# Patient Record
Sex: MALE | Race: WHITE | NOT HISPANIC OR LATINO | URBAN - METROPOLITAN AREA
[De-identification: names, ages, dates, MRNs, and addresses within clinical notes are randomized per-mention and may not be internally consistent; named-entity substitution may affect disease eponyms.]

---

## 2017-03-21 PROBLEM — Z00.00 ENCOUNTER FOR PREVENTIVE HEALTH EXAMINATION: Status: ACTIVE | Noted: 2017-03-21

## 2017-03-23 ENCOUNTER — OUTPATIENT (OUTPATIENT)
Dept: OUTPATIENT SERVICES | Facility: HOSPITAL | Age: 82
LOS: 1 days | Discharge: HOME | End: 2017-03-23

## 2017-06-27 DIAGNOSIS — Z95.5 PRESENCE OF CORONARY ANGIOPLASTY IMPLANT AND GRAFT: ICD-10-CM

## 2017-06-27 DIAGNOSIS — I25.10 ATHEROSCLEROTIC HEART DISEASE OF NATIVE CORONARY ARTERY WITHOUT ANGINA PECTORIS: ICD-10-CM

## 2017-06-27 DIAGNOSIS — H25.89 OTHER AGE-RELATED CATARACT: ICD-10-CM

## 2018-01-08 ENCOUNTER — APPOINTMENT (OUTPATIENT)
Dept: VASCULAR SURGERY | Facility: CLINIC | Age: 83
End: 2018-01-08
Payer: MEDICARE

## 2018-01-08 VITALS
DIASTOLIC BLOOD PRESSURE: 52 MMHG | BODY MASS INDEX: 30.36 KG/M2 | SYSTOLIC BLOOD PRESSURE: 126 MMHG | HEIGHT: 69 IN | WEIGHT: 205 LBS

## 2018-01-08 DIAGNOSIS — Z78.9 OTHER SPECIFIED HEALTH STATUS: ICD-10-CM

## 2018-01-08 DIAGNOSIS — Z86.79 PERSONAL HISTORY OF OTHER DISEASES OF THE CIRCULATORY SYSTEM: ICD-10-CM

## 2018-01-08 DIAGNOSIS — Z86.39 PERSONAL HISTORY OF OTHER ENDOCRINE, NUTRITIONAL AND METABOLIC DISEASE: ICD-10-CM

## 2018-01-08 DIAGNOSIS — Z87.438 PERSONAL HISTORY OF OTHER DISEASES OF MALE GENITAL ORGANS: ICD-10-CM

## 2018-01-08 PROCEDURE — 99213 OFFICE O/P EST LOW 20 MIN: CPT

## 2018-01-08 PROCEDURE — 93880 EXTRACRANIAL BILAT STUDY: CPT

## 2018-01-08 RX ORDER — PREGABALIN 225 MG/1
225 CAPSULE ORAL
Refills: 0 | Status: ACTIVE | COMMUNITY

## 2018-01-08 RX ORDER — AMLODIPINE BESYLATE 2.5 MG/1
2.5 TABLET ORAL
Refills: 0 | Status: ACTIVE | COMMUNITY

## 2018-01-08 RX ORDER — TERAZOSIN 2 MG/1
2 CAPSULE ORAL
Refills: 0 | Status: ACTIVE | COMMUNITY

## 2018-01-08 RX ORDER — ATORVASTATIN CALCIUM 20 MG/1
20 TABLET, FILM COATED ORAL
Refills: 0 | Status: ACTIVE | COMMUNITY

## 2018-01-08 RX ORDER — TERAZOSIN 5 MG/1
5 CAPSULE ORAL
Refills: 0 | Status: ACTIVE | COMMUNITY

## 2018-01-08 RX ORDER — METOPROLOL SUCCINATE 25 MG/1
25 TABLET, EXTENDED RELEASE ORAL
Refills: 0 | Status: ACTIVE | COMMUNITY

## 2018-01-08 RX ORDER — LEVOTHYROXINE SODIUM 0.07 MG/1
75 TABLET ORAL
Refills: 0 | Status: ACTIVE | COMMUNITY

## 2018-01-08 RX ORDER — CHOLECALCIFEROL (VITAMIN D3) 25 MCG
TABLET ORAL
Refills: 0 | Status: ACTIVE | COMMUNITY

## 2018-01-08 RX ORDER — ASPIRIN 81 MG
81 TABLET, DELAYED RELEASE (ENTERIC COATED) ORAL
Refills: 0 | Status: ACTIVE | COMMUNITY

## 2018-01-08 RX ORDER — AMIODARONE HYDROCHLORIDE 100 MG/1
100 TABLET ORAL
Refills: 0 | Status: ACTIVE | COMMUNITY

## 2018-01-08 RX ORDER — CALCIUM 250 MG
TABLET ORAL
Refills: 0 | Status: ACTIVE | COMMUNITY

## 2018-01-08 RX ORDER — APIXABAN 2.5 MG/1
2.5 TABLET, FILM COATED ORAL
Refills: 0 | Status: ACTIVE | COMMUNITY

## 2018-02-13 ENCOUNTER — OUTPATIENT (OUTPATIENT)
Dept: OUTPATIENT SERVICES | Facility: HOSPITAL | Age: 83
LOS: 1 days | Discharge: HOME | End: 2018-02-13

## 2018-02-13 DIAGNOSIS — E03.1 CONGENITAL HYPOTHYROIDISM WITHOUT GOITER: ICD-10-CM

## 2018-02-13 DIAGNOSIS — N18.4 CHRONIC KIDNEY DISEASE, STAGE 4 (SEVERE): ICD-10-CM

## 2018-02-13 DIAGNOSIS — I25.10 ATHEROSCLEROTIC HEART DISEASE OF NATIVE CORONARY ARTERY WITHOUT ANGINA PECTORIS: ICD-10-CM

## 2018-02-13 DIAGNOSIS — D64.9 ANEMIA, UNSPECIFIED: ICD-10-CM

## 2018-06-05 ENCOUNTER — EMERGENCY (EMERGENCY)
Facility: HOSPITAL | Age: 83
LOS: 0 days | Discharge: NURSING, REHAB OR LONG-TERM CARE | End: 2018-06-05
Attending: EMERGENCY MEDICINE | Admitting: EMERGENCY MEDICINE

## 2018-06-05 VITALS
RESPIRATION RATE: 18 BRPM | HEART RATE: 50 BPM | DIASTOLIC BLOOD PRESSURE: 89 MMHG | SYSTOLIC BLOOD PRESSURE: 185 MMHG | TEMPERATURE: 96 F | WEIGHT: 205.03 LBS | OXYGEN SATURATION: 98 % | HEIGHT: 69 IN

## 2018-06-05 VITALS
RESPIRATION RATE: 18 BRPM | TEMPERATURE: 96 F | SYSTOLIC BLOOD PRESSURE: 167 MMHG | DIASTOLIC BLOOD PRESSURE: 89 MMHG | HEART RATE: 43 BPM | OXYGEN SATURATION: 98 %

## 2018-06-05 DIAGNOSIS — I10 ESSENTIAL (PRIMARY) HYPERTENSION: ICD-10-CM

## 2018-06-05 DIAGNOSIS — Z79.899 OTHER LONG TERM (CURRENT) DRUG THERAPY: ICD-10-CM

## 2018-06-05 DIAGNOSIS — Z88.0 ALLERGY STATUS TO PENICILLIN: ICD-10-CM

## 2018-06-05 DIAGNOSIS — M25.551 PAIN IN RIGHT HIP: ICD-10-CM

## 2018-06-05 DIAGNOSIS — E03.9 HYPOTHYROIDISM, UNSPECIFIED: ICD-10-CM

## 2018-06-05 DIAGNOSIS — E78.00 PURE HYPERCHOLESTEROLEMIA, UNSPECIFIED: ICD-10-CM

## 2018-06-05 DIAGNOSIS — G47.30 SLEEP APNEA, UNSPECIFIED: Chronic | ICD-10-CM

## 2018-06-05 RX ORDER — APIXABAN 2.5 MG/1
0 TABLET, FILM COATED ORAL
Qty: 0 | Refills: 0 | COMMUNITY

## 2018-06-05 RX ORDER — OXYCODONE AND ACETAMINOPHEN 5; 325 MG/1; MG/1
2 TABLET ORAL ONCE
Qty: 0 | Refills: 0 | Status: DISCONTINUED | OUTPATIENT
Start: 2018-06-05 | End: 2018-06-05

## 2018-06-05 RX ADMIN — OXYCODONE AND ACETAMINOPHEN 2 TABLET(S): 5; 325 TABLET ORAL at 12:05

## 2018-06-05 NOTE — ED ADULT TRIAGE NOTE - TEMPERATURE IN FAHRENHEIT (DEGREES F)
Has Your Skin Lesion Been Treated?: not been treated
Is This A New Presentation, Or A Follow-Up?: Skin Lesions
96.3

## 2018-06-05 NOTE — ED PROVIDER NOTE - PHYSICAL EXAMINATION
Vital Signs: I have reviewed the initial vital signs.  Constitutional: well-nourished, no acute distress, normocephalic  Eyes: PERRLA, EOMI, no nystagmus, clear conjunctiva  ENT: MMM, TM b/l clear , no nasal congestion  Cardiovascular: regular rate, regular rhythm, no murmur appreciated  Respiratory: unlabored respiratory effort, clear to auscultation bilaterally  Gastrointestinal: soft, non-tender, non-distended  abdomen, no pulsatile mass  Musculoskeletal: (+) decreased flexion and extension of the leg secondary to pain, no shortening or rotation to the leg, (+) tenderness to the right lateral aspect of the hip  Integumentary: warm, dry, no rash  Neurologic: awake, alert, cranial nerves II-XII grossly intact, extremities’ motor and sensory functions grossly intact, no focal deficits, GCS 15  Psychiatric: appropriate mood, appropriate affect Vital Signs: I have reviewed the initial vital signs.  Constitutional: well-nourished, no acute distress, normocephalic  Eyes: PERRLA, EOMI, no nystagmus, clear conjunctiva  ENT: MMM, TM b/l clear ,  Cardiovascular: regular rate, regular rhythm, no murmur appreciated  Respiratory: unlabored respiratory effort, clear to auscultation bilaterally  Gastrointestinal: soft, non-tender, non-distended  abdomen, no pulsatile mass  Musculoskeletal: (+) decreased flexion and extension of the leg secondary to pain, no shortening or rotation to the leg, (+) tenderness to the right lateral aspect of the hip  Integumentary: warm, dry, no rash  Neurologic: awake, alert, cranial nerves II-XII grossly intact, extremities’ motor and sensory functions grossly intact, no focal deficits, GCS 15  Psychiatric: appropriate mood, appropriate affect

## 2018-06-05 NOTE — ED PROVIDER NOTE - OBJECTIVE STATEMENT
Pt comes in c/o right hip pain. Pt states that he woke up fine and did his daily routine with no issue, got on the elevator and when exiting the elevator he felt severe pain in the right hip, tried to take a step the pain increased and was unable to bear weight in the extremity secondary to pain. pt states that he takes Lyrica for back pain and did take it this morning. Pt denies any injury or trauma to the extremity. Pt c/o right hip pain. Pt states that he woke up symptomatic and preformed his daily routine, got on the elevator and when exiting the elevator he felt  pain in the right hip, tried to take a step the pain increased and was unable to bear weight in the extremity secondary to pain. pt states that he takes Lyrica for back pain and did take it this morning. Pt denies any injury or trauma to the extremity. 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

## 2018-06-05 NOTE — ED PROVIDER NOTE - ATTENDING CONTRIBUTION TO CARE
I personally evaluated the patient. I reviewed the Resident’s or Physician Assistant’s note (as assigned above), and agree with the findings and plan except as documented in my note.  right lateral hip pain as above, on exam has some ttp over right greater trochanter with pain on internal rotation of hip and weightbearing, FROM, NVI, imaging reviewed, feels better, is able to ambulate with assistance, uses cane and/or walker at baseline, lives in independent living facility without steps, declines observation/rehab eval, will d/c with ortho f/u. Patient counseled regarding conditions which should prompt return.

## 2018-06-05 NOTE — ED PROVIDER NOTE - NS ED ROS FT
Review of Systems    Constitutional: (-) fever/ chills (-) weight loss  Eyes/ENT: (-) blurry vision, (-) epistaxis (-) sore throat (-) ear pain  Cardiovascular: (-) chest pain, (-) syncope (-) palpitations  Respiratory: (-) cough, (-) shortness of breath  Gastrointestinal: (-) vomiting, (-) diarrhea (-) abdominal pain  Musculoskeletal: (-) neck pain, (-) back pain, (+)right hip joint pain (-) pedal edema   Integumentary: (-) rash, (-) swelling  Neurological: (-) headache, (-) altered mental status  Psychiatric: (-) hallucinations or depression   Allergic/Immunologic: (-) pruritus

## 2018-08-22 ENCOUNTER — OUTPATIENT (OUTPATIENT)
Dept: OUTPATIENT SERVICES | Facility: HOSPITAL | Age: 83
LOS: 1 days | Discharge: HOME | End: 2018-08-22

## 2018-08-22 DIAGNOSIS — E78.5 HYPERLIPIDEMIA, UNSPECIFIED: ICD-10-CM

## 2018-08-22 DIAGNOSIS — R80.9 PROTEINURIA, UNSPECIFIED: ICD-10-CM

## 2018-08-22 DIAGNOSIS — I10 ESSENTIAL (PRIMARY) HYPERTENSION: ICD-10-CM

## 2018-08-22 DIAGNOSIS — D64.9 ANEMIA, UNSPECIFIED: ICD-10-CM

## 2018-08-22 DIAGNOSIS — G47.30 SLEEP APNEA, UNSPECIFIED: Chronic | ICD-10-CM

## 2018-08-22 DIAGNOSIS — N18.4 CHRONIC KIDNEY DISEASE, STAGE 4 (SEVERE): ICD-10-CM

## 2018-08-23 PROBLEM — I10 ESSENTIAL (PRIMARY) HYPERTENSION: Chronic | Status: ACTIVE | Noted: 2018-06-05

## 2018-08-23 PROBLEM — E03.9 HYPOTHYROIDISM, UNSPECIFIED: Chronic | Status: ACTIVE | Noted: 2018-06-05

## 2018-08-23 PROBLEM — E78.00 PURE HYPERCHOLESTEROLEMIA, UNSPECIFIED: Chronic | Status: ACTIVE | Noted: 2018-06-05

## 2019-01-04 ENCOUNTER — TRANSCRIPTION ENCOUNTER (OUTPATIENT)
Age: 84
End: 2019-01-04

## 2019-01-07 ENCOUNTER — APPOINTMENT (OUTPATIENT)
Dept: VASCULAR SURGERY | Facility: CLINIC | Age: 84
End: 2019-01-07
Payer: MEDICARE

## 2019-01-07 VITALS
HEIGHT: 69 IN | BODY MASS INDEX: 30.36 KG/M2 | DIASTOLIC BLOOD PRESSURE: 70 MMHG | SYSTOLIC BLOOD PRESSURE: 130 MMHG | WEIGHT: 205 LBS

## 2019-01-07 DIAGNOSIS — I65.23 OCCLUSION AND STENOSIS OF BILATERAL CAROTID ARTERIES: ICD-10-CM

## 2019-01-07 PROCEDURE — 99212 OFFICE O/P EST SF 10 MIN: CPT

## 2019-01-07 PROCEDURE — 93880 EXTRACRANIAL BILAT STUDY: CPT

## 2019-02-21 ENCOUNTER — APPOINTMENT (OUTPATIENT)
Dept: VASCULAR SURGERY | Facility: CLINIC | Age: 84
End: 2019-02-21

## 2019-02-26 ENCOUNTER — OUTPATIENT (OUTPATIENT)
Dept: OUTPATIENT SERVICES | Facility: HOSPITAL | Age: 84
LOS: 1 days | Discharge: HOME | End: 2019-02-26

## 2019-02-26 DIAGNOSIS — G47.30 SLEEP APNEA, UNSPECIFIED: Chronic | ICD-10-CM

## 2019-02-27 DIAGNOSIS — Z02.9 ENCOUNTER FOR ADMINISTRATIVE EXAMINATIONS, UNSPECIFIED: ICD-10-CM

## 2019-03-05 ENCOUNTER — OUTPATIENT (OUTPATIENT)
Dept: OUTPATIENT SERVICES | Facility: HOSPITAL | Age: 84
LOS: 1 days | Discharge: HOME | End: 2019-03-05

## 2019-03-05 DIAGNOSIS — Z02.9 ENCOUNTER FOR ADMINISTRATIVE EXAMINATIONS, UNSPECIFIED: ICD-10-CM

## 2019-03-05 DIAGNOSIS — G47.30 SLEEP APNEA, UNSPECIFIED: Chronic | ICD-10-CM

## 2019-04-09 ENCOUNTER — OUTPATIENT (OUTPATIENT)
Dept: OUTPATIENT SERVICES | Facility: HOSPITAL | Age: 84
LOS: 1 days | Discharge: HOME | End: 2019-04-09

## 2019-04-09 DIAGNOSIS — M10.9 GOUT, UNSPECIFIED: ICD-10-CM

## 2019-04-09 DIAGNOSIS — E55.9 VITAMIN D DEFICIENCY, UNSPECIFIED: ICD-10-CM

## 2019-04-09 DIAGNOSIS — G47.30 SLEEP APNEA, UNSPECIFIED: Chronic | ICD-10-CM

## 2019-04-09 DIAGNOSIS — E83.52 HYPERCALCEMIA: ICD-10-CM

## 2019-04-09 DIAGNOSIS — Z92.29 PERSONAL HISTORY OF OTHER DRUG THERAPY: ICD-10-CM

## 2019-04-09 DIAGNOSIS — R74.8 ABNORMAL LEVELS OF OTHER SERUM ENZYMES: ICD-10-CM

## 2019-09-11 ENCOUNTER — OUTPATIENT (OUTPATIENT)
Dept: OUTPATIENT SERVICES | Facility: HOSPITAL | Age: 84
LOS: 1 days | Discharge: HOME | End: 2019-09-11

## 2019-09-11 DIAGNOSIS — B58.81 TOXOPLASMA MYOCARDITIS: ICD-10-CM

## 2019-09-11 DIAGNOSIS — G47.30 SLEEP APNEA, UNSPECIFIED: Chronic | ICD-10-CM

## 2019-09-11 DIAGNOSIS — I25.10 ATHEROSCLEROTIC HEART DISEASE OF NATIVE CORONARY ARTERY WITHOUT ANGINA PECTORIS: ICD-10-CM

## 2019-09-11 DIAGNOSIS — R94.6 ABNORMAL RESULTS OF THYROID FUNCTION STUDIES: ICD-10-CM

## 2019-12-18 NOTE — ASU DISCHARGE PLAN (ADULT/PEDIATRIC) - ***IN THE EVENT THAT YOU DEVELOP A COMPLICATION AND YOU ARE UNABLE TO REACH YOUR OWN PHYSICIAN, YOU MAY CONTACT:
Pt 92% on RA post triage/O2 nc placed at 2L to increase Saturation. Pt not on O2 at [facility]. Pt has history of MS. Statement Selected

## 2019-12-19 ENCOUNTER — OUTPATIENT (OUTPATIENT)
Dept: OUTPATIENT SERVICES | Facility: HOSPITAL | Age: 84
LOS: 1 days | Discharge: HOME | End: 2019-12-19

## 2019-12-19 VITALS
RESPIRATION RATE: 18 BRPM | HEART RATE: 58 BPM | TEMPERATURE: 97 F | SYSTOLIC BLOOD PRESSURE: 172 MMHG | HEIGHT: 70 IN | WEIGHT: 214.95 LBS | OXYGEN SATURATION: 95 % | DIASTOLIC BLOOD PRESSURE: 79 MMHG

## 2019-12-19 VITALS — DIASTOLIC BLOOD PRESSURE: 71 MMHG | SYSTOLIC BLOOD PRESSURE: 181 MMHG | HEART RATE: 55 BPM

## 2019-12-19 DIAGNOSIS — H25.12 AGE-RELATED NUCLEAR CATARACT, LEFT EYE: ICD-10-CM

## 2019-12-19 DIAGNOSIS — G47.30 SLEEP APNEA, UNSPECIFIED: Chronic | ICD-10-CM

## 2019-12-19 DIAGNOSIS — Z98.890 OTHER SPECIFIED POSTPROCEDURAL STATES: Chronic | ICD-10-CM

## 2019-12-19 NOTE — ASU PATIENT PROFILE, ADULT - PSH
History of surgery  RIGHT EYE CATARACT EXTRACTION WITH IOL, SPINAL FUSION, LUMBAR DISCETOMY,AP  Sleep apnea

## 2020-01-03 DIAGNOSIS — H25.092 OTHER AGE-RELATED INCIPIENT CATARACT, LEFT EYE: ICD-10-CM

## 2020-01-03 DIAGNOSIS — Z79.01 LONG TERM (CURRENT) USE OF ANTICOAGULANTS: ICD-10-CM

## 2020-01-03 DIAGNOSIS — G47.33 OBSTRUCTIVE SLEEP APNEA (ADULT) (PEDIATRIC): ICD-10-CM

## 2020-01-03 DIAGNOSIS — Z79.82 LONG TERM (CURRENT) USE OF ASPIRIN: ICD-10-CM

## 2020-01-04 ENCOUNTER — INPATIENT (INPATIENT)
Facility: HOSPITAL | Age: 85
LOS: 1 days | Discharge: NURSING, REHAB OR LONG-TERM CARE | End: 2020-01-06
Attending: STUDENT IN AN ORGANIZED HEALTH CARE EDUCATION/TRAINING PROGRAM | Admitting: STUDENT IN AN ORGANIZED HEALTH CARE EDUCATION/TRAINING PROGRAM
Payer: MEDICARE

## 2020-01-04 VITALS
HEART RATE: 60 BPM | RESPIRATION RATE: 20 BRPM | DIASTOLIC BLOOD PRESSURE: 81 MMHG | TEMPERATURE: 98 F | OXYGEN SATURATION: 97 % | SYSTOLIC BLOOD PRESSURE: 198 MMHG

## 2020-01-04 DIAGNOSIS — Z98.890 OTHER SPECIFIED POSTPROCEDURAL STATES: Chronic | ICD-10-CM

## 2020-01-04 DIAGNOSIS — G47.30 SLEEP APNEA, UNSPECIFIED: Chronic | ICD-10-CM

## 2020-01-04 PROBLEM — I48.91 UNSPECIFIED ATRIAL FIBRILLATION: Chronic | Status: ACTIVE | Noted: 2019-12-19

## 2020-01-04 LAB
ALBUMIN SERPL ELPH-MCNC: 3.7 G/DL — SIGNIFICANT CHANGE UP (ref 3.5–5.2)
ALP SERPL-CCNC: 69 U/L — SIGNIFICANT CHANGE UP (ref 30–115)
ALT FLD-CCNC: 19 U/L — SIGNIFICANT CHANGE UP (ref 0–41)
ANION GAP SERPL CALC-SCNC: 12 MMOL/L — SIGNIFICANT CHANGE UP (ref 7–14)
ANION GAP SERPL CALC-SCNC: 9 MMOL/L — SIGNIFICANT CHANGE UP (ref 7–14)
APPEARANCE UR: CLEAR — SIGNIFICANT CHANGE UP
APTT BLD: 30 SEC — SIGNIFICANT CHANGE UP (ref 27–39.2)
AST SERPL-CCNC: 28 U/L — SIGNIFICANT CHANGE UP (ref 0–41)
BACTERIA # UR AUTO: NEGATIVE — SIGNIFICANT CHANGE UP
BASOPHILS # BLD AUTO: 0.03 K/UL — SIGNIFICANT CHANGE UP (ref 0–0.2)
BASOPHILS # BLD AUTO: 0.03 K/UL — SIGNIFICANT CHANGE UP (ref 0–0.2)
BASOPHILS NFR BLD AUTO: 0.4 % — SIGNIFICANT CHANGE UP (ref 0–1)
BASOPHILS NFR BLD AUTO: 0.4 % — SIGNIFICANT CHANGE UP (ref 0–1)
BILIRUB SERPL-MCNC: 0.3 MG/DL — SIGNIFICANT CHANGE UP (ref 0.2–1.2)
BILIRUB UR-MCNC: NEGATIVE — SIGNIFICANT CHANGE UP
BUN SERPL-MCNC: 36 MG/DL — HIGH (ref 10–20)
BUN SERPL-MCNC: 44 MG/DL — HIGH (ref 10–20)
CALCIUM SERPL-MCNC: 10.1 MG/DL — SIGNIFICANT CHANGE UP (ref 8.5–10.1)
CALCIUM SERPL-MCNC: 9.2 MG/DL — SIGNIFICANT CHANGE UP (ref 8.5–10.1)
CHLORIDE SERPL-SCNC: 110 MMOL/L — SIGNIFICANT CHANGE UP (ref 98–110)
CHLORIDE SERPL-SCNC: 113 MMOL/L — HIGH (ref 98–110)
CO2 SERPL-SCNC: 24 MMOL/L — SIGNIFICANT CHANGE UP (ref 17–32)
CO2 SERPL-SCNC: 25 MMOL/L — SIGNIFICANT CHANGE UP (ref 17–32)
COLOR SPEC: SIGNIFICANT CHANGE UP
CREAT SERPL-MCNC: 1.7 MG/DL — HIGH (ref 0.7–1.5)
CREAT SERPL-MCNC: 2.1 MG/DL — HIGH (ref 0.7–1.5)
DIFF PNL FLD: NEGATIVE — SIGNIFICANT CHANGE UP
EOSINOPHIL # BLD AUTO: 0.17 K/UL — SIGNIFICANT CHANGE UP (ref 0–0.7)
EOSINOPHIL # BLD AUTO: 0.36 K/UL — SIGNIFICANT CHANGE UP (ref 0–0.7)
EOSINOPHIL NFR BLD AUTO: 2 % — SIGNIFICANT CHANGE UP (ref 0–8)
EOSINOPHIL NFR BLD AUTO: 4.9 % — SIGNIFICANT CHANGE UP (ref 0–8)
EPI CELLS # UR: 0 /HPF — SIGNIFICANT CHANGE UP (ref 0–5)
ETHANOL SERPL-MCNC: <10 MG/DL — SIGNIFICANT CHANGE UP
GLUCOSE SERPL-MCNC: 108 MG/DL — HIGH (ref 70–99)
GLUCOSE SERPL-MCNC: 128 MG/DL — HIGH (ref 70–99)
GLUCOSE UR QL: NEGATIVE — SIGNIFICANT CHANGE UP
HCT VFR BLD CALC: 37 % — LOW (ref 42–52)
HCT VFR BLD CALC: 38.2 % — LOW (ref 42–52)
HGB BLD-MCNC: 11.8 G/DL — LOW (ref 14–18)
HGB BLD-MCNC: 12.2 G/DL — LOW (ref 14–18)
HYALINE CASTS # UR AUTO: 0 /LPF — SIGNIFICANT CHANGE UP (ref 0–7)
IMM GRANULOCYTES NFR BLD AUTO: 0.4 % — HIGH (ref 0.1–0.3)
IMM GRANULOCYTES NFR BLD AUTO: 0.5 % — HIGH (ref 0.1–0.3)
INR BLD: 1.08 RATIO — SIGNIFICANT CHANGE UP (ref 0.65–1.3)
KETONES UR-MCNC: NEGATIVE — SIGNIFICANT CHANGE UP
LACTATE SERPL-SCNC: 0.9 MMOL/L — SIGNIFICANT CHANGE UP (ref 0.7–2)
LEUKOCYTE ESTERASE UR-ACNC: NEGATIVE — SIGNIFICANT CHANGE UP
LIDOCAIN IGE QN: 19 U/L — SIGNIFICANT CHANGE UP (ref 7–60)
LYMPHOCYTES # BLD AUTO: 1.31 K/UL — SIGNIFICANT CHANGE UP (ref 1.2–3.4)
LYMPHOCYTES # BLD AUTO: 1.47 K/UL — SIGNIFICANT CHANGE UP (ref 1.2–3.4)
LYMPHOCYTES # BLD AUTO: 15.3 % — LOW (ref 20.5–51.1)
LYMPHOCYTES # BLD AUTO: 19.9 % — LOW (ref 20.5–51.1)
MAGNESIUM SERPL-MCNC: 1.9 MG/DL — SIGNIFICANT CHANGE UP (ref 1.8–2.4)
MCHC RBC-ENTMCNC: 31.9 G/DL — LOW (ref 32–37)
MCHC RBC-ENTMCNC: 31.9 G/DL — LOW (ref 32–37)
MCHC RBC-ENTMCNC: 33.9 PG — HIGH (ref 27–31)
MCHC RBC-ENTMCNC: 33.9 PG — HIGH (ref 27–31)
MCV RBC AUTO: 106.1 FL — HIGH (ref 80–94)
MCV RBC AUTO: 106.3 FL — HIGH (ref 80–94)
MONOCYTES # BLD AUTO: 1.04 K/UL — HIGH (ref 0.1–0.6)
MONOCYTES # BLD AUTO: 1.14 K/UL — HIGH (ref 0.1–0.6)
MONOCYTES NFR BLD AUTO: 13.3 % — HIGH (ref 1.7–9.3)
MONOCYTES NFR BLD AUTO: 14.1 % — HIGH (ref 1.7–9.3)
NEUTROPHILS # BLD AUTO: 4.45 K/UL — SIGNIFICANT CHANGE UP (ref 1.4–6.5)
NEUTROPHILS # BLD AUTO: 5.89 K/UL — SIGNIFICANT CHANGE UP (ref 1.4–6.5)
NEUTROPHILS NFR BLD AUTO: 60.2 % — SIGNIFICANT CHANGE UP (ref 42.2–75.2)
NEUTROPHILS NFR BLD AUTO: 68.6 % — SIGNIFICANT CHANGE UP (ref 42.2–75.2)
NITRITE UR-MCNC: NEGATIVE — SIGNIFICANT CHANGE UP
NRBC # BLD: 0 /100 WBCS — SIGNIFICANT CHANGE UP (ref 0–0)
NRBC # BLD: 0 /100 WBCS — SIGNIFICANT CHANGE UP (ref 0–0)
PH UR: 6 — SIGNIFICANT CHANGE UP (ref 5–8)
PHOSPHATE SERPL-MCNC: 2.9 MG/DL — SIGNIFICANT CHANGE UP (ref 2.1–4.9)
PLATELET # BLD AUTO: 101 K/UL — LOW (ref 130–400)
PLATELET # BLD AUTO: 106 K/UL — LOW (ref 130–400)
POTASSIUM SERPL-MCNC: 4.4 MMOL/L — SIGNIFICANT CHANGE UP (ref 3.5–5)
POTASSIUM SERPL-MCNC: 4.4 MMOL/L — SIGNIFICANT CHANGE UP (ref 3.5–5)
POTASSIUM SERPL-SCNC: 4.4 MMOL/L — SIGNIFICANT CHANGE UP (ref 3.5–5)
POTASSIUM SERPL-SCNC: 4.4 MMOL/L — SIGNIFICANT CHANGE UP (ref 3.5–5)
PROT SERPL-MCNC: 6 G/DL — SIGNIFICANT CHANGE UP (ref 6–8)
PROT UR-MCNC: ABNORMAL
PROTHROM AB SERPL-ACNC: 12.4 SEC — SIGNIFICANT CHANGE UP (ref 9.95–12.87)
RBC # BLD: 3.48 M/UL — LOW (ref 4.7–6.1)
RBC # BLD: 3.6 M/UL — LOW (ref 4.7–6.1)
RBC # FLD: 14.1 % — SIGNIFICANT CHANGE UP (ref 11.5–14.5)
RBC # FLD: 14.1 % — SIGNIFICANT CHANGE UP (ref 11.5–14.5)
RBC CASTS # UR COMP ASSIST: 1 /HPF — SIGNIFICANT CHANGE UP (ref 0–4)
SODIUM SERPL-SCNC: 146 MMOL/L — SIGNIFICANT CHANGE UP (ref 135–146)
SODIUM SERPL-SCNC: 147 MMOL/L — HIGH (ref 135–146)
SP GR SPEC: 1.03 — HIGH (ref 1.01–1.02)
TROPONIN T SERPL-MCNC: 0.02 NG/ML — HIGH
UROBILINOGEN FLD QL: SIGNIFICANT CHANGE UP
WBC # BLD: 7.39 K/UL — SIGNIFICANT CHANGE UP (ref 4.8–10.8)
WBC # BLD: 8.57 K/UL — SIGNIFICANT CHANGE UP (ref 4.8–10.8)
WBC # FLD AUTO: 7.39 K/UL — SIGNIFICANT CHANGE UP (ref 4.8–10.8)
WBC # FLD AUTO: 8.57 K/UL — SIGNIFICANT CHANGE UP (ref 4.8–10.8)
WBC UR QL: 1 /HPF — SIGNIFICANT CHANGE UP (ref 0–5)

## 2020-01-04 PROCEDURE — 72146 MRI CHEST SPINE W/O DYE: CPT | Mod: 26

## 2020-01-04 PROCEDURE — 72125 CT NECK SPINE W/O DYE: CPT | Mod: 26

## 2020-01-04 PROCEDURE — 99284 EMERGENCY DEPT VISIT MOD MDM: CPT

## 2020-01-04 PROCEDURE — 12011 RPR F/E/E/N/L/M 2.5 CM/<: CPT

## 2020-01-04 PROCEDURE — 93010 ELECTROCARDIOGRAM REPORT: CPT

## 2020-01-04 PROCEDURE — 70450 CT HEAD/BRAIN W/O DYE: CPT | Mod: 26

## 2020-01-04 PROCEDURE — 71045 X-RAY EXAM CHEST 1 VIEW: CPT | Mod: 26

## 2020-01-04 PROCEDURE — 74177 CT ABD & PELVIS W/CONTRAST: CPT | Mod: 26

## 2020-01-04 PROCEDURE — 72170 X-RAY EXAM OF PELVIS: CPT | Mod: 26

## 2020-01-04 PROCEDURE — 99223 1ST HOSP IP/OBS HIGH 75: CPT

## 2020-01-04 PROCEDURE — 71260 CT THORAX DX C+: CPT | Mod: 26

## 2020-01-04 PROCEDURE — 99291 CRITICAL CARE FIRST HOUR: CPT | Mod: 25,GC

## 2020-01-04 RX ORDER — LABETALOL HCL 100 MG
10 TABLET ORAL ONCE
Refills: 0 | Status: COMPLETED | OUTPATIENT
Start: 2020-01-04 | End: 2020-01-04

## 2020-01-04 RX ORDER — AMIODARONE HYDROCHLORIDE 400 MG/1
100 TABLET ORAL DAILY
Refills: 0 | Status: DISCONTINUED | OUTPATIENT
Start: 2020-01-04 | End: 2020-01-06

## 2020-01-04 RX ORDER — DOXAZOSIN MESYLATE 4 MG
4 TABLET ORAL AT BEDTIME
Refills: 0 | Status: DISCONTINUED | OUTPATIENT
Start: 2020-01-04 | End: 2020-01-04

## 2020-01-04 RX ORDER — SODIUM CHLORIDE 9 MG/ML
1000 INJECTION INTRAMUSCULAR; INTRAVENOUS; SUBCUTANEOUS
Refills: 0 | Status: DISCONTINUED | OUTPATIENT
Start: 2020-01-04 | End: 2020-01-05

## 2020-01-04 RX ORDER — AMLODIPINE BESYLATE 2.5 MG/1
0 TABLET ORAL
Qty: 90 | Refills: 0 | DISCHARGE

## 2020-01-04 RX ORDER — LOSARTAN/HYDROCHLOROTHIAZIDE 100MG-25MG
1 TABLET ORAL
Qty: 0 | Refills: 0 | DISCHARGE

## 2020-01-04 RX ORDER — LEVOTHYROXINE SODIUM 125 MCG
1 TABLET ORAL
Qty: 0 | Refills: 0 | DISCHARGE

## 2020-01-04 RX ORDER — AMLODIPINE BESYLATE 2.5 MG/1
2.5 TABLET ORAL DAILY
Refills: 0 | Status: DISCONTINUED | OUTPATIENT
Start: 2020-01-04 | End: 2020-01-06

## 2020-01-04 RX ORDER — HEPARIN SODIUM 5000 [USP'U]/ML
5000 INJECTION INTRAVENOUS; SUBCUTANEOUS EVERY 8 HOURS
Refills: 0 | Status: DISCONTINUED | OUTPATIENT
Start: 2020-01-04 | End: 2020-01-06

## 2020-01-04 RX ORDER — CALCITRIOL 0.5 UG/1
0 CAPSULE ORAL
Qty: 90 | Refills: 0 | DISCHARGE

## 2020-01-04 RX ORDER — LOSARTAN POTASSIUM 100 MG/1
50 TABLET, FILM COATED ORAL DAILY
Refills: 0 | Status: DISCONTINUED | OUTPATIENT
Start: 2020-01-04 | End: 2020-01-04

## 2020-01-04 RX ORDER — LOSARTAN POTASSIUM 100 MG/1
0 TABLET, FILM COATED ORAL
Qty: 180 | Refills: 0 | DISCHARGE

## 2020-01-04 RX ORDER — ALLOPURINOL 300 MG
100 TABLET ORAL DAILY
Refills: 0 | Status: DISCONTINUED | OUTPATIENT
Start: 2020-01-04 | End: 2020-01-06

## 2020-01-04 RX ORDER — ACETAMINOPHEN 500 MG
650 TABLET ORAL EVERY 6 HOURS
Refills: 0 | Status: DISCONTINUED | OUTPATIENT
Start: 2020-01-04 | End: 2020-01-06

## 2020-01-04 RX ORDER — OXYCODONE HYDROCHLORIDE 5 MG/1
5 TABLET ORAL EVERY 6 HOURS
Refills: 0 | Status: DISCONTINUED | OUTPATIENT
Start: 2020-01-04 | End: 2020-01-05

## 2020-01-04 RX ORDER — METOPROLOL TARTRATE 50 MG
0 TABLET ORAL
Qty: 0 | Refills: 0 | DISCHARGE

## 2020-01-04 RX ORDER — LEVOTHYROXINE SODIUM 125 MCG
50 TABLET ORAL DAILY
Refills: 0 | Status: DISCONTINUED | OUTPATIENT
Start: 2020-01-04 | End: 2020-01-04

## 2020-01-04 RX ORDER — MORPHINE SULFATE 50 MG/1
4 CAPSULE, EXTENDED RELEASE ORAL ONCE
Refills: 0 | Status: DISCONTINUED | OUTPATIENT
Start: 2020-01-04 | End: 2020-01-04

## 2020-01-04 RX ORDER — DOXAZOSIN MESYLATE 4 MG
2 TABLET ORAL AT BEDTIME
Refills: 0 | Status: DISCONTINUED | OUTPATIENT
Start: 2020-01-04 | End: 2020-01-06

## 2020-01-04 RX ORDER — DOXAZOSIN MESYLATE 4 MG
4 TABLET ORAL DAILY
Refills: 0 | Status: DISCONTINUED | OUTPATIENT
Start: 2020-01-04 | End: 2020-01-06

## 2020-01-04 RX ORDER — METOPROLOL TARTRATE 50 MG
25 TABLET ORAL DAILY
Refills: 0 | Status: DISCONTINUED | OUTPATIENT
Start: 2020-01-04 | End: 2020-01-06

## 2020-01-04 RX ORDER — PREDNISOLONE SODIUM PHOSPHATE 1 %
1 DROPS OPHTHALMIC (EYE)
Refills: 0 | Status: DISCONTINUED | OUTPATIENT
Start: 2020-01-04 | End: 2020-01-06

## 2020-01-04 RX ORDER — CHLORHEXIDINE GLUCONATE 213 G/1000ML
1 SOLUTION TOPICAL DAILY
Refills: 0 | Status: DISCONTINUED | OUTPATIENT
Start: 2020-01-04 | End: 2020-01-06

## 2020-01-04 RX ORDER — ALLOPURINOL 300 MG
0 TABLET ORAL
Qty: 0 | Refills: 0 | DISCHARGE

## 2020-01-04 RX ORDER — AMLODIPINE BESYLATE 2.5 MG/1
1 TABLET ORAL
Qty: 0 | Refills: 0 | DISCHARGE

## 2020-01-04 RX ORDER — ATORVASTATIN CALCIUM 80 MG/1
20 TABLET, FILM COATED ORAL AT BEDTIME
Refills: 0 | Status: DISCONTINUED | OUTPATIENT
Start: 2020-01-04 | End: 2020-01-06

## 2020-01-04 RX ORDER — METOPROLOL TARTRATE 50 MG
25 TABLET ORAL DAILY
Refills: 0 | Status: DISCONTINUED | OUTPATIENT
Start: 2020-01-04 | End: 2020-01-04

## 2020-01-04 RX ORDER — AMLODIPINE BESYLATE 2.5 MG/1
5 TABLET ORAL DAILY
Refills: 0 | Status: DISCONTINUED | OUTPATIENT
Start: 2020-01-04 | End: 2020-01-04

## 2020-01-04 RX ORDER — LEVOTHYROXINE SODIUM 125 MCG
75 TABLET ORAL DAILY
Refills: 0 | Status: DISCONTINUED | OUTPATIENT
Start: 2020-01-04 | End: 2020-01-06

## 2020-01-04 RX ORDER — CALCITRIOL 0.5 UG/1
0.25 CAPSULE ORAL DAILY
Refills: 0 | Status: DISCONTINUED | OUTPATIENT
Start: 2020-01-04 | End: 2020-01-06

## 2020-01-04 RX ORDER — PANTOPRAZOLE SODIUM 20 MG/1
40 TABLET, DELAYED RELEASE ORAL
Refills: 0 | Status: DISCONTINUED | OUTPATIENT
Start: 2020-01-04 | End: 2020-01-06

## 2020-01-04 RX ADMIN — Medication 75 MILLIGRAM(S): at 14:54

## 2020-01-04 RX ADMIN — ATORVASTATIN CALCIUM 20 MILLIGRAM(S): 80 TABLET, FILM COATED ORAL at 22:03

## 2020-01-04 RX ADMIN — SODIUM CHLORIDE 125 MILLILITER(S): 9 INJECTION INTRAMUSCULAR; INTRAVENOUS; SUBCUTANEOUS at 17:39

## 2020-01-04 RX ADMIN — CALCITRIOL 0.25 MICROGRAM(S): 0.5 CAPSULE ORAL at 17:01

## 2020-01-04 RX ADMIN — AMLODIPINE BESYLATE 5 MILLIGRAM(S): 2.5 TABLET ORAL at 14:45

## 2020-01-04 RX ADMIN — OXYCODONE HYDROCHLORIDE 5 MILLIGRAM(S): 5 TABLET ORAL at 23:14

## 2020-01-04 RX ADMIN — MORPHINE SULFATE 4 MILLIGRAM(S): 50 CAPSULE, EXTENDED RELEASE ORAL at 10:49

## 2020-01-04 RX ADMIN — Medication 10 MILLIGRAM(S): at 14:48

## 2020-01-04 RX ADMIN — HEPARIN SODIUM 5000 UNIT(S): 5000 INJECTION INTRAVENOUS; SUBCUTANEOUS at 14:47

## 2020-01-04 RX ADMIN — HEPARIN SODIUM 5000 UNIT(S): 5000 INJECTION INTRAVENOUS; SUBCUTANEOUS at 22:03

## 2020-01-04 RX ADMIN — Medication 2 MILLIGRAM(S): at 22:03

## 2020-01-04 RX ADMIN — Medication 200 MILLIGRAM(S): at 22:03

## 2020-01-04 RX ADMIN — Medication 650 MILLIGRAM(S): at 17:00

## 2020-01-04 RX ADMIN — Medication 100 MILLIGRAM(S): at 17:00

## 2020-01-04 RX ADMIN — Medication 25 MILLIGRAM(S): at 17:00

## 2020-01-04 RX ADMIN — OXYCODONE HYDROCHLORIDE 5 MILLIGRAM(S): 5 TABLET ORAL at 14:58

## 2020-01-04 RX ADMIN — SODIUM CHLORIDE 125 MILLILITER(S): 9 INJECTION INTRAMUSCULAR; INTRAVENOUS; SUBCUTANEOUS at 15:02

## 2020-01-04 RX ADMIN — OXYCODONE HYDROCHLORIDE 5 MILLIGRAM(S): 5 TABLET ORAL at 23:02

## 2020-01-04 NOTE — ED PROVIDER NOTE - CLINICAL SUMMARY MEDICAL DECISION MAKING FREE TEXT BOX
88M p/w fall 40min pta, HT, fall on Eliquis, unknown loc, syncope vs mechanical. On physical dry blood in the nares with occipital hematoma. remainder of the physical not noted to have acute traumatic injuries. trauma alert called. ordered cxr/pelvis, ct panscan. collar placed. CT + findings-Nondisplaced oblique fracture through the anterior aspect of the T4 vertebral body. Neuro surg consulted. Admitted to trauma service. Review of labs wnl. ED CXR reviewed by me which did not reveal a ptx, infiltrate, or effusion. ED Pelvis portable XR negative for fx's or dislocations

## 2020-01-04 NOTE — H&P ADULT - NSICDXPASTMEDICALHX_GEN_ALL_CORE_FT
PAST MEDICAL HISTORY:  Atrial fibrillation CAD WITH 2 STENTS    Gout     High cholesterol     Hypertension     Hypothyroid

## 2020-01-04 NOTE — ED ADULT NURSE NOTE - OBJECTIVE STATEMENT
Patient BIBEMS s/p fall at Assisted Living facility, patient states he slipped and fell, hit his head but denies LOC, patient on Eliquis, Trauma Alert activated upon arrival, patient has laceration to parietal area of head. Patient c/o neck and back pain.

## 2020-01-04 NOTE — ED PROVIDER NOTE - OBJECTIVE STATEMENT
89 y/o M PMHx HTN, CAD s/p stents on Eliquis presents to ED from assisted living facility s/p mechanical fall. Pt tripped and fell backwards on to a table this morning. C/o pain to the back of his head and bridge of his nose. No chest pain/SOB. Denies LOC. 89 y/o M PMHx HTN, HLD, CAD s/p stents, afib on Eliquis, sleep apnea on CPAP, hypothyroidism presents to ED from assisted living facility s/p mechanical fall. Pt tripped and fell backwards on to a table this morning. C/o pain to the back of his head and bridge of his nose. No chest pain/SOB. Denies LOC.

## 2020-01-04 NOTE — CONSULT NOTE ADULT - ASSESSMENT
ASSESSMENT:  87 y/o male with PMHx of HTN, HLD, CAD s/p stents, Hypothyroidism, Afib on Eliquis, Sleep apnea on CPAP presents to ED from assisted living facility s/p mechanical fall. +HT, -LOC.      PLAN:    - CXR, Pelvic xray  - Pan Scan (CTT head, C-spine, CT Chest, CT Abdomen/Pelvis)  - Trauma labs (CBC, BMP, Coags, T&S, UA)  - d/w Jim

## 2020-01-04 NOTE — H&P ADULT - NSHPLABSRESULTS_GEN_ALL_CORE
LABS  CBC ( @ 09:00)                              12.2<L>                         7.39    )----------------(  106<L>     60.2  % Neutrophils, 19.9<L>% Lymphocytes, ANC: 4.45                                38.2<L>    BMP ( @ 09:00)             147<H>  |  110     |  44<H> 		Ca++ --      Ca 10.1               ---------------------------------( 128<H>		Mg --                 4.4     |  25      |  2.1<H>			Ph --        LFTs ( @ 09:00)      TPro 6.0 / Alb 3.7 / TBili 0.3 / DBili -- / AST 28 / ALT 19 / AlkPhos 69    Coags ( @ 09:00)  aPTT 30.0 / INR 1.08 / PT 12.40      ABG ( 09:00)      /  /  /  /  / %     Lactate:  0.9      --------------------------------------------------------------------------------------------    MICROBIOLOGY  Urinalysis ( @ 11:05):     Color: Light Yellow / Appearance: Clear / S.030<H> / pH: 6.0 / Gluc: Negative / Ketones: Negative / Bili: Negative / Urobili: <2 mg/dL / Protein :30 mg/dL<!> / Nitrites: Negative / Leuk.Est: Negative / RBC: 1 / WBC: 1 / Sq Epi:  / Non Sq Epi: 0 / Bacteria Negative         --------------------------------------------------------------------------------------------  IMAGING:   < from: CT Abdomen and Pelvis w/ IV Cont (20 @ 10:08) >      IMPRESSION:     Nondisplaced oblique fracture through the anterior aspect of the T4 vertebral body (series 7 image 50).    Otherwise no CT evidence of acute traumatic chest/abdominopelvic pathology.    < end of copied text >    < from: CT Cervical Spine No Cont (20 @ 10:02) >    Impression: Postop changes the described above.    Degenerative changes are identified.    No acute fractures or dislocations are seen.    < end of copied text >    < from: CT Head No Cont (20 @ 10:02) >    Impression: Postop changes the described above.    Degenerative changes are identified.    No acute fractures or dislocations are seen.    < end of copied text >    < from: Xray Pelvis AP only (20 @ 09:37) >    Impression:  No evidence of acute fracture.    < end of copied text >

## 2020-01-04 NOTE — H&P ADULT - ATTENDING COMMENTS
s/p fall   87 y/o male with PMHx of HTN, HLD, CAD s/p stents on ASA, Hypothyroidism, Afib on Eliquis, Sleep apnea on CPAP, Gout presents to ED from assisted living facility s/p mechanical fall. +HT, -LOC.     Lumbar fracture  will need neurosurgery   will need MRI

## 2020-01-04 NOTE — CONSULT NOTE ADULT - SUBJECTIVE AND OBJECTIVE BOX
HPI: 89 y/o M PMHx HTN, HLD, CAD s/p stents, Afib on Eliquis, sleep apnea on CPAP, hypothyroidism presents to ED from assisted living facility s/p mechanical fall. Pt tripped and fell backwards on to a table this morning. C/o pain to the back of his head and bridge of his nose. No chest pain/SOB. Denies LOC.    Pt seen and examined at bedside with Dr Mcdonald.  Pt c/o pain to upper mid back after fall today.  Denies radicular symptoms, paresthesia or numbness to ext.          PAST MEDICAL & SURGICAL HISTORY:  Atrial fibrillation: CAD WITH 2 STENTS  Hypothyroid  Hypertension  High cholesterol  History of surgery: RIGHT EYE CATARACT EXTRACTION WITH IOL, SPINAL FUSION, LUMBAR DISCETOMY,AP  Sleep apnea      Home Medications:  allopurinol 100 mg oral tablet: orally once a day (19 Dec 2019 09:14)  amiodarone 100 mg oral tablet: tab(s) orally once a day (19 Dec 2019 09:14)  aspirin 81 mg oral tablet: 1 tab(s) orally once a day (19 Dec 2019 09:14)  atorvastatin 20 mg oral tablet: 1 tab(s) orally once a day (19 Dec 2019 09:14)  Eliquis 2.5 mg oral tablet: orally 2 times a day (19 Dec 2019 09:14)  levothyroxine 50 mcg (0.05 mg) oral capsule: 1 cap(s) orally once a day (19 Dec 2019 09:14)  losartan-hydrochlorothiazide 50mg-12.5mg oral tablet: 1 tab(s) orally once a day (19 Dec 2019 09:14)  Lyrica 75 mg oral capsule: orally 3 times a day (19 Dec 2019 09:14)  metoprolol tartrate 25 mg oral tablet: orally once a day (19 Dec 2019 09:14)  terazosin 5 mg oral tablet: 7 milligram(s) orally once a day (19 Dec 2019 09:14)      Allergies    penicillin (Rash)    Intolerances        MEDICATIONS  (STANDING):    MEDICATIONS  (PRN):      ICU Vital Signs Last 24 Hrs  T(C): 36.7 (2020 09:08), Max: 36.7 (2020 08:50)  T(F): 98.1 (2020 09:08), Max: 98.1 (2020 08:50)  HR: 59 (2020 09:08) (59 - 61)  BP: 203/67 (2020 09:08) (198/81 - 218/72)  BP(mean): --  ABP: --  ABP(mean): --  RR: 18 (2020 09:08) (18 - 20)  SpO2: 98% (2020 09:08) (97% - 98%)      I&O's Detail      CBC Full  -  ( 2020 09:00 )  WBC Count : 7.39 K/uL  RBC Count : 3.60 M/uL  Hemoglobin : 12.2 g/dL  Hematocrit : 38.2 %  Platelet Count - Automated : 106 K/uL  Mean Cell Volume : 106.1 fL  Mean Cell Hemoglobin : 33.9 pg  Mean Cell Hemoglobin Concentration : 31.9 g/dL  Auto Neutrophil # : 4.45 K/uL  Auto Lymphocyte # : 1.47 K/uL  Auto Monocyte # : 1.04 K/uL  Auto Eosinophil # : 0.36 K/uL  Auto Basophil # : 0.03 K/uL  Auto Neutrophil % : 60.2 %  Auto Lymphocyte % : 19.9 %  Auto Monocyte % : 14.1 %  Auto Eosinophil % : 4.9 %  Auto Basophil % : 0.4 %    01-04    147<H>  |  110  |  44<H>  ----------------------------<  128<H>  4.4   |  25  |  2.1<H>    Ca    10.1      2020 09:00    TPro  6.0  /  Alb  3.7  /  TBili  0.3  /  DBili  x   /  AST  28  /  ALT  19  /  AlkPhos  69  01-04    CARDIAC MARKERS ( 2020 09:00 )  x     / 0.02 ng/mL / x     / x     / x          Urinalysis Basic - ( 2020 11:05 )    Color: Light Yellow / Appearance: Clear / S.030 / pH: x  Gluc: x / Ketone: Negative  / Bili: Negative / Urobili: <2 mg/dL   Blood: x / Protein: 30 mg/dL / Nitrite: Negative   Leuk Esterase: Negative / RBC: 1 /HPF / WBC 1 /HPF   Sq Epi: x / Non Sq Epi: 0 /HPF / Bacteria: Negative        AAOX3. Verbal function intact  follows commands  PERRL  Motor: MAEx4  5/5 power in b/l UE and LE  5/5 DF/PF  Sensation: intact to touch in all extremities  +TTP to upper back        Imaging: < from: CT Chest w/ IV Cont (20 @ 10:07) >  IMPRESSION:     Nondisplaced oblique fracture through the anterior aspect of the T4 vertebral body (series 7 image 50).    < end of copied text >      Assessment/Plan:  No acute neurosurgical intervention  C-collar removed by Neurosgx attg  recommend MRI T-spine  pain management  muscle relaxer if needed  d/w attg

## 2020-01-04 NOTE — H&P ADULT - HISTORY OF PRESENT ILLNESS
89 y/o male with PMHx of HTN, HLD, CAD s/p stents on ASA, Hypothyroidism, Afib on Eliquis, Sleep apnea on CPAP, Gout presents to ED from assisted living facility s/p mechanical fall. +HT, -LOC.  Patient states that he tried to  something from the floor and then lost balance and fell backwards on to a table this morning. He is c/o of come pain in the back of his head and some back pain. Denies any chest pain, abdominal pain, or pain in his extremities

## 2020-01-04 NOTE — ED PROVIDER NOTE - NSFOLLOWUPINSTRUCTIONS_ED_ALL_ED_FT
Laceration    A laceration is a cut that goes through all of the layers of the skin and into the tissue that is right under the skin. Some lacerations heal on their own. Others need to be closed with stitches (sutures), staples, skin adhesive strips, or skin glue. Proper laceration care minimizes the risk of infection and helps the laceration to heal better.     SEEK IMMEDIATE MEDICAL CARE IF YOU HAVE THE FOLLOWING SYMPTOMS: swelling around the wound, worsening pain, drainage from the wound, red streaking going away from your wound, inability to move finger or toe near the laceration, or discoloration of skin near the laceration.     You have 2 absorbable sutures. You do NOT need to have any sutures removed. Follow up with your PMD if any complications develop.

## 2020-01-04 NOTE — ED PROVIDER NOTE - PHYSICAL EXAMINATION
CONSTITUTIONAL: well developed, well nourished, no acute distress  TRAUMA: ABC intact, GCS 15  HEAD: normocephalic, +3cm superficial abrasion/scalp hematoma to top of head   EYES: PERRL at 2mm, EOMI, no raccoon eyes  ENT: no nasal discharge, no septal hematoma, no hemotympanum, no mead sign, moist mucous membranes moist, no mandibular instability, no mandibular malalignment. +1cm lac to bridge of nose.   NECK: cervical collar in place, no midline tenderness, no stepoffs, no deformity  CV: regular rate and rhythm, equal distal pulses  RESP: lungs clear to auscultation bilaterally, normal work of breathing, symmetric rise and fall of chest   CHEST: no chest wall tenderness, no crepitus, no clavicular deformity/tenting  ABD: soft, nondistended, nontender, no rebound, no guarding, no rigidity, no pelvic instability  BACK: +midlineT/L spine tenderness, no stepoffs, no deformity, no saddle paresthesia  EXT: no obvious deformity, no tenderness of extremities, full ROM, cap refill < 2 seconds  SKIN: no rashes, +laceration to nasal bridge, +abrasions to top of head   NEURO: A&Ox3, motor strength 5/5 in all extremities, sensation grossly intact throughout, no focal neurological deficits  PSYCH: normal mood, appropriate affect

## 2020-01-04 NOTE — CONSULT NOTE ADULT - SUBJECTIVE AND OBJECTIVE BOX
88y m  88y m    TRAUMA ACTIVATION LEVEL:  trauma alert    MECHANISM OF INJURY:      [] Blunt  	[] MVC	[x] Fall	[] Pedestrian Struck	[] Motorcycle   [] Assault   [] Bicycle collision  [] Sports injury     [] Penetrating  	[] Gun Shot Wound 		[] Stab Wound    GCS: 	E: 4	V: 5	M: 6    88y old m s/p  HPI: 89 y/o male with PMHx of HTN, HLD, CAD s/p stents, Hypothyroidism, Afib on Eliquis, Sleep apnea on CPAP presents to ED from assisted living facility s/p mechanical fall. +HT, -LOC.  Patient states that he tried to  something from the floor and then lost balance and fell backwards on to a table this morning. He is c/o of come pain in the back of his head. Denies any chest pain, abdominal pain, or pain in his extremities    PAST MEDICAL & SURGICAL HISTORY:  Atrial fibrillation: CAD WITH 2 STENTS  Hypothyroid  Hypertension  High cholesterol  History of surgery: RIGHT EYE CATARACT EXTRACTION WITH IOL, SPINAL FUSION, LUMBAR DISCETOMY,AP  Sleep apnea    Allergies    penicillin (Rash)    Intolerances    Home Medications:  allopurinol 100 mg oral tablet: orally once a day (19 Dec 2019 09:14)  amiodarone 100 mg oral tablet: tab(s) orally once a day (19 Dec 2019 09:14)  aspirin 81 mg oral tablet: 1 tab(s) orally once a day (19 Dec 2019 09:14)  atorvastatin 20 mg oral tablet: 1 tab(s) orally once a day (19 Dec 2019 09:14)  Eliquis 2.5 mg oral tablet: orally 2 times a day (19 Dec 2019 09:14)  levothyroxine 50 mcg (0.05 mg) oral capsule: 1 cap(s) orally once a day (19 Dec 2019 09:14)  losartan-hydrochlorothiazide 50mg-12.5mg oral tablet: 1 tab(s) orally once a day (19 Dec 2019 09:14)  Lyrica 75 mg oral capsule: orally 3 times a day (19 Dec 2019 09:14)  metoprolol tartrate 25 mg oral tablet: orally once a day (19 Dec 2019 09:14)  terazosin 5 mg oral tablet: 7 milligram(s) orally once a day (19 Dec 2019 09:14)    ROS: 10-system review is otherwise negative except HPI above.      Primary Survey:    A - airway intact  B - bilateral breath sounds and good chest rise  C - palpable pulses in all extremities  D - GCS 15 on arrival, HARRIS  Exposure obtained    Vital Signs Last 24 Hrs  T(C): 36.7 (04 Jan 2020 09:08), Max: 36.7 (04 Jan 2020 08:50)  T(F): 98.1 (04 Jan 2020 09:08), Max: 98.1 (04 Jan 2020 08:50)  HR: 59 (04 Jan 2020 09:08) (59 - 61)  BP: 203/67 (04 Jan 2020 09:08) (198/81 - 218/72)  RR: 18 (04 Jan 2020 09:08) (18 - 20)  SpO2: 98% (04 Jan 2020 09:08) (97% - 98%)    Secondary Survey:   General: NAD  HEENT: Hematoma on the back of the head. abrasion over the nose  Neck: Soft, midline trachea. no cspine tenderness  Chest: No chest wall tenderness. or subq  emphysema   Cardiac: S1, S2,   Respiratory: Bilateral breath sounds, clear and equal bilaterally  Abdomen: Soft, non-distended, non-tender, no rebound,   Groin: pelvis stable   Ext: palp radial b/l UE, b/l DP palp in Lower Extrem.   Back: TTP over thoracic and lumbar spine, no palpable runoff/stepoff/deformity  Rectal: No allie blood, FRANDY with good tone    FAST    Procedures:    LABS:  pending      RADIOLOGY & ADDITIONAL STUDIES:  pending    ---------------------------------------------------------------------------------------

## 2020-01-04 NOTE — ED PROVIDER NOTE - PROGRESS NOTE DETAILS
dl - t4 nondisplaced vertebral body fx on ct scan. neurosurg consulted will eval pt DL- neurosurg recommending MRI, will admit to trauma service for further management

## 2020-01-04 NOTE — H&P ADULT - NSICDXPASTSURGICALHX_GEN_ALL_CORE_FT
PAST SURGICAL HISTORY:  History of surgery RIGHT EYE CATARACT EXTRACTION WITH IOL, SPINAL FUSION, LUMBAR DISCETOMY,AP    Sleep apnea

## 2020-01-04 NOTE — H&P ADULT - ASSESSMENT
87 y/o male with PMHx of HTN, HLD, CAD s/p stents on ASA, Hypothyroidism, Afib on Eliquis, Sleep apnea on CPAP, Gout presents to ED from assisted living facility s/p mechanical fall. +HT, -LOC. He sustained an 1cm laceration over his nose which was sutured by ED.   He was PAN scanned and found to have Nondisplaced oblique fracture through the anterior aspect of the T4 vertebral body.     Plan:   Admit to trauma   NPO, IVF  Bedrest for now  follow up neurosurgery recomendations  follow up MRI of the T-spine  Home meds  DVT/GI prophylaxis  PT/rehab  Pain control     Discussed with Dr Fernandez and Dr Velasco

## 2020-01-04 NOTE — H&P ADULT - NSHPPHYSICALEXAM_GEN_ALL_CORE
TRAUMA LEVEL OF ACTIVATION: ALERT  MECHANISM OF INJURY: fall    PHYSICAL EXAM:     Primary Survey:    A - airway intact  B - bilateral breath sounds and good chest rise  C - palpable pulses in all extremities  D - GCS 15 on arrival  Exposure obtained    General: NAD  HEENT: Hematoma on the back of the head. abrasion over the nose  Neck: Soft, midline trachea. no cspine tenderness  Chest: No chest wall tenderness. or subq  emphysema   Cardiac: S1, S2,   Respiratory: Bilateral breath sounds, clear and equal bilaterally  Abdomen: Soft, non-distended, non-tender, no rebound,   Groin: pelvis stable   Ext: palp radial b/l UE, b/l DP palp in Lower Extrem.   Back: TTP over thoracic and lumbar spine, no palpable runoff/stepoff/deformity

## 2020-01-05 LAB
ANION GAP SERPL CALC-SCNC: 10 MMOL/L — SIGNIFICANT CHANGE UP (ref 7–14)
BASOPHILS # BLD AUTO: 0.02 K/UL — SIGNIFICANT CHANGE UP (ref 0–0.2)
BASOPHILS NFR BLD AUTO: 0.2 % — SIGNIFICANT CHANGE UP (ref 0–1)
BUN SERPL-MCNC: 35 MG/DL — HIGH (ref 10–20)
CALCIUM SERPL-MCNC: 9.2 MG/DL — SIGNIFICANT CHANGE UP (ref 8.5–10.1)
CHLORIDE SERPL-SCNC: 113 MMOL/L — HIGH (ref 98–110)
CO2 SERPL-SCNC: 23 MMOL/L — SIGNIFICANT CHANGE UP (ref 17–32)
CREAT SERPL-MCNC: 1.8 MG/DL — HIGH (ref 0.7–1.5)
EOSINOPHIL # BLD AUTO: 0.09 K/UL — SIGNIFICANT CHANGE UP (ref 0–0.7)
EOSINOPHIL NFR BLD AUTO: 1.1 % — SIGNIFICANT CHANGE UP (ref 0–8)
GLUCOSE SERPL-MCNC: 150 MG/DL — HIGH (ref 70–99)
HCT VFR BLD CALC: 35.3 % — LOW (ref 42–52)
HGB BLD-MCNC: 11.1 G/DL — LOW (ref 14–18)
IMM GRANULOCYTES NFR BLD AUTO: 0.4 % — HIGH (ref 0.1–0.3)
LYMPHOCYTES # BLD AUTO: 0.98 K/UL — LOW (ref 1.2–3.4)
LYMPHOCYTES # BLD AUTO: 11.9 % — LOW (ref 20.5–51.1)
MAGNESIUM SERPL-MCNC: 2 MG/DL — SIGNIFICANT CHANGE UP (ref 1.8–2.4)
MCHC RBC-ENTMCNC: 31.4 G/DL — LOW (ref 32–37)
MCHC RBC-ENTMCNC: 33.9 PG — HIGH (ref 27–31)
MCV RBC AUTO: 108 FL — HIGH (ref 80–94)
MONOCYTES # BLD AUTO: 1.07 K/UL — HIGH (ref 0.1–0.6)
MONOCYTES NFR BLD AUTO: 13 % — HIGH (ref 1.7–9.3)
NEUTROPHILS # BLD AUTO: 6.03 K/UL — SIGNIFICANT CHANGE UP (ref 1.4–6.5)
NEUTROPHILS NFR BLD AUTO: 73.4 % — SIGNIFICANT CHANGE UP (ref 42.2–75.2)
NRBC # BLD: 0 /100 WBCS — SIGNIFICANT CHANGE UP (ref 0–0)
PHOSPHATE SERPL-MCNC: 2.8 MG/DL — SIGNIFICANT CHANGE UP (ref 2.1–4.9)
PLATELET # BLD AUTO: 93 K/UL — LOW (ref 130–400)
POTASSIUM SERPL-MCNC: 5 MMOL/L — SIGNIFICANT CHANGE UP (ref 3.5–5)
POTASSIUM SERPL-SCNC: 5 MMOL/L — SIGNIFICANT CHANGE UP (ref 3.5–5)
RBC # BLD: 3.27 M/UL — LOW (ref 4.7–6.1)
RBC # FLD: 14.2 % — SIGNIFICANT CHANGE UP (ref 11.5–14.5)
SODIUM SERPL-SCNC: 146 MMOL/L — SIGNIFICANT CHANGE UP (ref 135–146)
WBC # BLD: 8.22 K/UL — SIGNIFICANT CHANGE UP (ref 4.8–10.8)
WBC # FLD AUTO: 8.22 K/UL — SIGNIFICANT CHANGE UP (ref 4.8–10.8)

## 2020-01-05 PROCEDURE — 99232 SBSQ HOSP IP/OBS MODERATE 35: CPT

## 2020-01-05 RX ORDER — METHOCARBAMOL 500 MG/1
750 TABLET, FILM COATED ORAL EVERY 6 HOURS
Refills: 0 | Status: DISCONTINUED | OUTPATIENT
Start: 2020-01-05 | End: 2020-01-06

## 2020-01-05 RX ORDER — OXYCODONE HYDROCHLORIDE 5 MG/1
10 TABLET ORAL EVERY 6 HOURS
Refills: 0 | Status: DISCONTINUED | OUTPATIENT
Start: 2020-01-05 | End: 2020-01-06

## 2020-01-05 RX ORDER — SODIUM CHLORIDE 9 MG/ML
1000 INJECTION INTRAMUSCULAR; INTRAVENOUS; SUBCUTANEOUS
Refills: 0 | Status: DISCONTINUED | OUTPATIENT
Start: 2020-01-05 | End: 2020-01-06

## 2020-01-05 RX ADMIN — Medication 75 MICROGRAM(S): at 05:32

## 2020-01-05 RX ADMIN — HEPARIN SODIUM 5000 UNIT(S): 5000 INJECTION INTRAVENOUS; SUBCUTANEOUS at 05:33

## 2020-01-05 RX ADMIN — PANTOPRAZOLE SODIUM 40 MILLIGRAM(S): 20 TABLET, DELAYED RELEASE ORAL at 06:11

## 2020-01-05 RX ADMIN — Medication 200 MILLIGRAM(S): at 05:38

## 2020-01-05 RX ADMIN — METHOCARBAMOL 750 MILLIGRAM(S): 500 TABLET, FILM COATED ORAL at 17:52

## 2020-01-05 RX ADMIN — OXYCODONE HYDROCHLORIDE 10 MILLIGRAM(S): 5 TABLET ORAL at 11:15

## 2020-01-05 RX ADMIN — Medication 25 MILLIGRAM(S): at 05:32

## 2020-01-05 RX ADMIN — Medication 1 DROP(S): at 17:53

## 2020-01-05 RX ADMIN — METHOCARBAMOL 750 MILLIGRAM(S): 500 TABLET, FILM COATED ORAL at 23:02

## 2020-01-05 RX ADMIN — HEPARIN SODIUM 5000 UNIT(S): 5000 INJECTION INTRAVENOUS; SUBCUTANEOUS at 14:40

## 2020-01-05 RX ADMIN — ATORVASTATIN CALCIUM 20 MILLIGRAM(S): 80 TABLET, FILM COATED ORAL at 21:16

## 2020-01-05 RX ADMIN — OXYCODONE HYDROCHLORIDE 5 MILLIGRAM(S): 5 TABLET ORAL at 06:11

## 2020-01-05 RX ADMIN — OXYCODONE HYDROCHLORIDE 5 MILLIGRAM(S): 5 TABLET ORAL at 05:38

## 2020-01-05 RX ADMIN — METHOCARBAMOL 750 MILLIGRAM(S): 500 TABLET, FILM COATED ORAL at 11:35

## 2020-01-05 RX ADMIN — Medication 650 MILLIGRAM(S): at 23:02

## 2020-01-05 RX ADMIN — HEPARIN SODIUM 5000 UNIT(S): 5000 INJECTION INTRAVENOUS; SUBCUTANEOUS at 21:18

## 2020-01-05 RX ADMIN — Medication 650 MILLIGRAM(S): at 23:32

## 2020-01-05 RX ADMIN — SODIUM CHLORIDE 75 MILLILITER(S): 9 INJECTION INTRAMUSCULAR; INTRAVENOUS; SUBCUTANEOUS at 08:53

## 2020-01-05 RX ADMIN — CHLORHEXIDINE GLUCONATE 1 APPLICATION(S): 213 SOLUTION TOPICAL at 11:28

## 2020-01-05 RX ADMIN — Medication 1 DROP(S): at 06:09

## 2020-01-05 RX ADMIN — AMIODARONE HYDROCHLORIDE 100 MILLIGRAM(S): 400 TABLET ORAL at 05:32

## 2020-01-05 RX ADMIN — Medication 2 MILLIGRAM(S): at 21:16

## 2020-01-05 RX ADMIN — Medication 100 MILLIGRAM(S): at 11:35

## 2020-01-05 RX ADMIN — Medication 650 MILLIGRAM(S): at 11:35

## 2020-01-05 RX ADMIN — AMLODIPINE BESYLATE 2.5 MILLIGRAM(S): 2.5 TABLET ORAL at 05:31

## 2020-01-05 RX ADMIN — Medication 200 MILLIGRAM(S): at 21:16

## 2020-01-05 RX ADMIN — Medication 650 MILLIGRAM(S): at 17:53

## 2020-01-05 RX ADMIN — OXYCODONE HYDROCHLORIDE 10 MILLIGRAM(S): 5 TABLET ORAL at 10:45

## 2020-01-05 RX ADMIN — Medication 4 MILLIGRAM(S): at 05:31

## 2020-01-05 RX ADMIN — CALCITRIOL 0.25 MICROGRAM(S): 0.5 CAPSULE ORAL at 11:35

## 2020-01-05 RX ADMIN — Medication 200 MILLIGRAM(S): at 14:40

## 2020-01-05 NOTE — PROGRESS NOTE ADULT - SUBJECTIVE AND OBJECTIVE BOX
Subjective:     Pt with improved upper thoracic back pain today. MRI shows acute T4 fracture as well as mild T5 and T6 compression fractures, acute. Minimal T4-6 EDH. No cord compression.   T(C): 36.2 (01-05-20 @ 15:57), Max: 36.3 (01-04-20 @ 22:58)  HR: 57 (01-05-20 @ 15:57) (55 - 59)  BP: 143/67 (01-05-20 @ 15:57) (133/86 - 200/94)  RR: 18 (01-05-20 @ 15:57) (18 - 19)  SpO2: 99% (01-05-20 @ 06:23) (99% - 99%)  Wt(kg): --    Exam:  A&Ox3  PERRL  FS, TM  MAEW    CBC Full  -  ( 04 Jan 2020 20:51 )  WBC Count : 8.57 K/uL  RBC Count : 3.48 M/uL  Hemoglobin : 11.8 g/dL  Hematocrit : 37.0 %  Platelet Count - Automated : 101 K/uL  Mean Cell Volume : 106.3 fL  Mean Cell Hemoglobin : 33.9 pg  Mean Cell Hemoglobin Concentration : 31.9 g/dL  Auto Neutrophil # : 5.89 K/uL  Auto Lymphocyte # : 1.31 K/uL  Auto Monocyte # : 1.14 K/uL  Auto Eosinophil # : 0.17 K/uL  Auto Basophil # : 0.03 K/uL  Auto Neutrophil % : 68.6 %  Auto Lymphocyte % : 15.3 %  Auto Monocyte % : 13.3 %  Auto Eosinophil % : 2.0 %  Auto Basophil % : 0.4 %    01-04    146  |  113<H>  |  36<H>  ----------------------------<  108<H>  4.4   |  24  |  1.7<H>    Ca    9.2      04 Jan 2020 20:51  Phos  2.9     01-04  Mg     1.9     01-04    TPro  6.0  /  Alb  3.7  /  TBili  0.3  /  DBili  x   /  AST  28  /  ALT  19  /  AlkPhos  69  01-04    PT/INR - ( 04 Jan 2020 09:00 )   PT: 12.40 sec;   INR: 1.08 ratio         PTT - ( 04 Jan 2020 09:00 )  PTT:30.0 sec          Assessment/Plan:      Pt with minimal back pain. Sig improved from yesterday. No neurologic deficit. Pt OK to be OOB and have PT. F/u as outpt 2 weeks post discharge

## 2020-01-05 NOTE — PROGRESS NOTE ADULT - ATTENDING COMMENTS
I examined the patient and discussed my plan with the resident    the patient has limited mobility at home, uses a cane, and has had frequent falls.   here he has an acute t4 vb fx and underwent mri for stability status.  if stable he will require pt evaluation and rehab placement.  he has pain but can not receive nsaids due to erica on ckd.

## 2020-01-05 NOTE — PROGRESS NOTE ADULT - ASSESSMENT
88M s/p fall at assisted living facility, +HT, -LOC, +eliquis for afib and ASA, found to have T4 vertebral body fracture.     PLAN:   - FU MRI thoracic spine read, NSX recommendations  - NPO, IVF  - Cr 2.1 > 1.7  - holding home losartan d/t PITER  - pain management  - encourage IS  -  hospitalist c/s, PT/rehab

## 2020-01-05 NOTE — PROGRESS NOTE ADULT - SUBJECTIVE AND OBJECTIVE BOX
GENERAL SURGERY PROGRESS NOTE     LITZY LÓPEZ  88y  Male  Hospital day :1d  POD:  Procedure:     OVERNIGHT EVENTS:  Admitted for T4 VB fracture. Received MRI thoracic spine, not read.     T(F): 97.3 (20 @ 22:58), Max: 98.1 (20 @ 08:50)  HR: 55 (20 @ 22:58) (55 - 61)  BP: 189/77 (20 @ 22:58) (154/65 - 218/72)  ABP: --  ABP(mean): --  RR: 19 (20 @ 22:58) (18 - 20)  SpO2: 98% (20 @ 15:20) (97% - 98%)    DIET/FLUIDS: calcitriol   Capsule 0.25 MICROGram(s) Oral daily  sodium chloride 0.9%. 1000 milliLiter(s) IV Continuous <Continuous>     GI proph:  pantoprazole    Tablet 40 milliGRAM(s) Oral before breakfast  AC/ proph: heparin  Injectable 5000 Unit(s) SubCutaneous every 8 hours      PHYSICAL EXAM:  GENERAL: NAD, well-appearing  CHEST/LUNG: Clear to auscultation bilaterally  HEART: Regular rate and rhythm  ABDOMEN: Soft, Nontender, Nondistended;   EXTREMITIES:  No clubbing, cyanosis, or edema      LABS  Labs:  CAPILLARY BLOOD GLUCOSE    POCT Blood Glucose.: 124 mg/dL (2020 08:54)                        11.8   8.57  )-----------( 101      ( 2020 20:51 )             37.0       Auto Neutrophil %: 68.6 % (20 @ 20:51)  Auto Immature Granulocyte %: 0.4 % (20 @ 20:51)  Auto Neutrophil %: 60.2 % (20 @ 09:00)  Auto Immature Granulocyte %: 0.5 % (20 @ 09:00)        146  |  113<H>  |  36<H>  ----------------------------<  108<H>  4.4   |  24  |  1.7<H>    Calcium, Total Serum: 9.2 mg/dL (20 @ 20:51)    LFTs:             6.0  | 0.3  | 28       ------------------[69      ( 2020 09:00 )  3.7  | x    | 19          Lipase:19     Amylase:x        Lactate, Blood: 0.9 mmol/L (20 @ 09:00)    Coags:     12.40  ----< 1.08    ( 2020 09:00 )     30.0      CARDIAC MARKERS ( 2020 09:00 )  x     / 0.02 ng/mL / x     / x     / x        Alcohol, Blood: <10 mg/dL (20 @ 09:00)    Urinalysis Basic - ( 2020 11:05 )    Color: Light Yellow / Appearance: Clear / S.030 / pH: x  Gluc: x / Ketone: Negative  / Bili: Negative / Urobili: <2 mg/dL   Blood: x / Protein: 30 mg/dL / Nitrite: Negative   Leuk Esterase: Negative / RBC: 1 /HPF / WBC 1 /HPF   Sq Epi: x / Non Sq Epi: 0 /HPF / Bacteria: Negative      RADIOLOGY & ADDITIONAL TESTS:  < from: CT Abdomen and Pelvis w/ IV Cont (20 @ 10:08) >  IMPRESSION:     Nondisplaced oblique fracture through the anterior aspect of the T4 vertebral body (series 7 image 50).    Otherwise no CT evidence of acute traumatic chest/abdominopelvic pathology.    < end of copied text >

## 2020-01-06 ENCOUNTER — INPATIENT (INPATIENT)
Facility: HOSPITAL | Age: 85
LOS: 8 days | Discharge: NURSING, REHAB OR LONG-TERM CARE | End: 2020-01-15
Attending: PHYSICAL MEDICINE & REHABILITATION | Admitting: PHYSICAL MEDICINE & REHABILITATION
Payer: MEDICARE

## 2020-01-06 ENCOUNTER — TRANSCRIPTION ENCOUNTER (OUTPATIENT)
Age: 85
End: 2020-01-06

## 2020-01-06 ENCOUNTER — APPOINTMENT (OUTPATIENT)
Dept: VASCULAR SURGERY | Facility: CLINIC | Age: 85
End: 2020-01-06

## 2020-01-06 VITALS
HEART RATE: 76 BPM | SYSTOLIC BLOOD PRESSURE: 168 MMHG | TEMPERATURE: 97 F | DIASTOLIC BLOOD PRESSURE: 76 MMHG | RESPIRATION RATE: 20 BRPM

## 2020-01-06 DIAGNOSIS — Z98.890 OTHER SPECIFIED POSTPROCEDURAL STATES: Chronic | ICD-10-CM

## 2020-01-06 DIAGNOSIS — G47.30 SLEEP APNEA, UNSPECIFIED: Chronic | ICD-10-CM

## 2020-01-06 LAB
ANION GAP SERPL CALC-SCNC: 9 MMOL/L — SIGNIFICANT CHANGE UP (ref 7–14)
BUN SERPL-MCNC: 35 MG/DL — HIGH (ref 10–20)
CALCIUM SERPL-MCNC: 9.5 MG/DL — SIGNIFICANT CHANGE UP (ref 8.5–10.1)
CHLORIDE SERPL-SCNC: 111 MMOL/L — HIGH (ref 98–110)
CO2 SERPL-SCNC: 24 MMOL/L — SIGNIFICANT CHANGE UP (ref 17–32)
CREAT SERPL-MCNC: 1.9 MG/DL — HIGH (ref 0.7–1.5)
GLUCOSE SERPL-MCNC: 120 MG/DL — HIGH (ref 70–99)
POTASSIUM SERPL-MCNC: 4.7 MMOL/L — SIGNIFICANT CHANGE UP (ref 3.5–5)
POTASSIUM SERPL-SCNC: 4.7 MMOL/L — SIGNIFICANT CHANGE UP (ref 3.5–5)
SODIUM SERPL-SCNC: 144 MMOL/L — SIGNIFICANT CHANGE UP (ref 135–146)

## 2020-01-06 PROCEDURE — 93010 ELECTROCARDIOGRAM REPORT: CPT

## 2020-01-06 PROCEDURE — 99232 SBSQ HOSP IP/OBS MODERATE 35: CPT

## 2020-01-06 RX ORDER — ACETAMINOPHEN 500 MG
650 TABLET ORAL EVERY 6 HOURS
Refills: 0 | Status: DISCONTINUED | OUTPATIENT
Start: 2020-01-06 | End: 2020-01-15

## 2020-01-06 RX ORDER — HEPARIN SODIUM 5000 [USP'U]/ML
5000 INJECTION INTRAVENOUS; SUBCUTANEOUS
Qty: 0 | Refills: 0 | DISCHARGE
Start: 2020-01-06

## 2020-01-06 RX ORDER — ATORVASTATIN CALCIUM 80 MG/1
20 TABLET, FILM COATED ORAL AT BEDTIME
Refills: 0 | Status: DISCONTINUED | OUTPATIENT
Start: 2020-01-06 | End: 2020-01-15

## 2020-01-06 RX ORDER — PREDNISOLONE SODIUM PHOSPHATE 1 %
1 DROPS OPHTHALMIC (EYE)
Qty: 0 | Refills: 0 | DISCHARGE
Start: 2020-01-06

## 2020-01-06 RX ORDER — ASPIRIN/CALCIUM CARB/MAGNESIUM 324 MG
1 TABLET ORAL
Qty: 0 | Refills: 0 | DISCHARGE

## 2020-01-06 RX ORDER — DOXAZOSIN MESYLATE 4 MG
4 TABLET ORAL DAILY
Refills: 0 | Status: DISCONTINUED | OUTPATIENT
Start: 2020-01-06 | End: 2020-01-15

## 2020-01-06 RX ORDER — AMLODIPINE BESYLATE 2.5 MG/1
2.5 TABLET ORAL DAILY
Refills: 0 | Status: DISCONTINUED | OUTPATIENT
Start: 2020-01-06 | End: 2020-01-15

## 2020-01-06 RX ORDER — POLYETHYLENE GLYCOL 3350 17 G/17G
17 POWDER, FOR SOLUTION ORAL ONCE
Refills: 0 | Status: COMPLETED | OUTPATIENT
Start: 2020-01-06 | End: 2020-01-06

## 2020-01-06 RX ORDER — OXYCODONE HYDROCHLORIDE 5 MG/1
5 TABLET ORAL EVERY 6 HOURS
Refills: 0 | Status: DISCONTINUED | OUTPATIENT
Start: 2020-01-06 | End: 2020-01-06

## 2020-01-06 RX ORDER — DOXAZOSIN MESYLATE 4 MG
2 TABLET ORAL AT BEDTIME
Refills: 0 | Status: DISCONTINUED | OUTPATIENT
Start: 2020-01-06 | End: 2020-01-15

## 2020-01-06 RX ORDER — OXYCODONE HYDROCHLORIDE 5 MG/1
5 TABLET ORAL EVERY 6 HOURS
Refills: 0 | Status: DISCONTINUED | OUTPATIENT
Start: 2020-01-06 | End: 2020-01-08

## 2020-01-06 RX ORDER — SODIUM CHLORIDE 9 MG/ML
250 INJECTION INTRAMUSCULAR; INTRAVENOUS; SUBCUTANEOUS ONCE
Refills: 0 | Status: COMPLETED | OUTPATIENT
Start: 2020-01-06 | End: 2020-01-06

## 2020-01-06 RX ORDER — PREDNISOLONE SODIUM PHOSPHATE 1 %
1 DROPS OPHTHALMIC (EYE)
Refills: 0 | Status: DISCONTINUED | OUTPATIENT
Start: 2020-01-06 | End: 2020-01-15

## 2020-01-06 RX ORDER — CALCITRIOL 0.5 UG/1
0.25 CAPSULE ORAL DAILY
Refills: 0 | Status: DISCONTINUED | OUTPATIENT
Start: 2020-01-06 | End: 2020-01-15

## 2020-01-06 RX ORDER — POLYETHYLENE GLYCOL 3350 17 G/17G
17 POWDER, FOR SOLUTION ORAL DAILY
Refills: 0 | Status: DISCONTINUED | OUTPATIENT
Start: 2020-01-06 | End: 2020-01-06

## 2020-01-06 RX ORDER — LEVOTHYROXINE SODIUM 125 MCG
75 TABLET ORAL DAILY
Refills: 0 | Status: DISCONTINUED | OUTPATIENT
Start: 2020-01-06 | End: 2020-01-15

## 2020-01-06 RX ORDER — ACETAMINOPHEN 500 MG
2 TABLET ORAL
Qty: 0 | Refills: 0 | DISCHARGE
Start: 2020-01-06

## 2020-01-06 RX ORDER — METOPROLOL TARTRATE 50 MG
25 TABLET ORAL DAILY
Refills: 0 | Status: DISCONTINUED | OUTPATIENT
Start: 2020-01-06 | End: 2020-01-13

## 2020-01-06 RX ORDER — AMIODARONE HYDROCHLORIDE 400 MG/1
100 TABLET ORAL DAILY
Refills: 0 | Status: DISCONTINUED | OUTPATIENT
Start: 2020-01-06 | End: 2020-01-14

## 2020-01-06 RX ORDER — METHOCARBAMOL 500 MG/1
750 TABLET, FILM COATED ORAL EVERY 6 HOURS
Refills: 0 | Status: DISCONTINUED | OUTPATIENT
Start: 2020-01-06 | End: 2020-01-09

## 2020-01-06 RX ORDER — SENNA PLUS 8.6 MG/1
2 TABLET ORAL AT BEDTIME
Refills: 0 | Status: DISCONTINUED | OUTPATIENT
Start: 2020-01-06 | End: 2020-01-06

## 2020-01-06 RX ORDER — METHOCARBAMOL 500 MG/1
1 TABLET, FILM COATED ORAL
Qty: 0 | Refills: 0 | DISCHARGE
Start: 2020-01-06

## 2020-01-06 RX ORDER — PANTOPRAZOLE SODIUM 20 MG/1
40 TABLET, DELAYED RELEASE ORAL
Refills: 0 | Status: DISCONTINUED | OUTPATIENT
Start: 2020-01-06 | End: 2020-01-15

## 2020-01-06 RX ORDER — POLYETHYLENE GLYCOL 3350 17 G/17G
17 POWDER, FOR SOLUTION ORAL DAILY
Refills: 0 | Status: DISCONTINUED | OUTPATIENT
Start: 2020-01-06 | End: 2020-01-15

## 2020-01-06 RX ORDER — APIXABAN 2.5 MG/1
0 TABLET, FILM COATED ORAL
Qty: 0 | Refills: 0 | DISCHARGE

## 2020-01-06 RX ORDER — OXYCODONE HYDROCHLORIDE 5 MG/1
1 TABLET ORAL
Qty: 0 | Refills: 0 | DISCHARGE
Start: 2020-01-06

## 2020-01-06 RX ORDER — HEPARIN SODIUM 5000 [USP'U]/ML
5000 INJECTION INTRAVENOUS; SUBCUTANEOUS EVERY 8 HOURS
Refills: 0 | Status: DISCONTINUED | OUTPATIENT
Start: 2020-01-06 | End: 2020-01-08

## 2020-01-06 RX ORDER — PANTOPRAZOLE SODIUM 20 MG/1
1 TABLET, DELAYED RELEASE ORAL
Qty: 0 | Refills: 0 | DISCHARGE
Start: 2020-01-06

## 2020-01-06 RX ORDER — SENNA PLUS 8.6 MG/1
2 TABLET ORAL AT BEDTIME
Refills: 0 | Status: DISCONTINUED | OUTPATIENT
Start: 2020-01-06 | End: 2020-01-15

## 2020-01-06 RX ORDER — ALLOPURINOL 300 MG
100 TABLET ORAL DAILY
Refills: 0 | Status: DISCONTINUED | OUTPATIENT
Start: 2020-01-06 | End: 2020-01-15

## 2020-01-06 RX ORDER — POLYETHYLENE GLYCOL 3350 17 G/17G
17 POWDER, FOR SOLUTION ORAL
Qty: 0 | Refills: 0 | DISCHARGE
Start: 2020-01-06

## 2020-01-06 RX ORDER — SENNA PLUS 8.6 MG/1
2 TABLET ORAL
Qty: 0 | Refills: 0 | DISCHARGE
Start: 2020-01-06

## 2020-01-06 RX ADMIN — HEPARIN SODIUM 5000 UNIT(S): 5000 INJECTION INTRAVENOUS; SUBCUTANEOUS at 13:16

## 2020-01-06 RX ADMIN — PANTOPRAZOLE SODIUM 40 MILLIGRAM(S): 20 TABLET, DELAYED RELEASE ORAL at 07:53

## 2020-01-06 RX ADMIN — OXYCODONE HYDROCHLORIDE 10 MILLIGRAM(S): 5 TABLET ORAL at 02:31

## 2020-01-06 RX ADMIN — OXYCODONE HYDROCHLORIDE 10 MILLIGRAM(S): 5 TABLET ORAL at 03:01

## 2020-01-06 RX ADMIN — Medication 1 DROP(S): at 05:08

## 2020-01-06 RX ADMIN — Medication 200 MILLIGRAM(S): at 05:10

## 2020-01-06 RX ADMIN — Medication 200 MILLIGRAM(S): at 13:18

## 2020-01-06 RX ADMIN — ATORVASTATIN CALCIUM 20 MILLIGRAM(S): 80 TABLET, FILM COATED ORAL at 22:20

## 2020-01-06 RX ADMIN — Medication 4 MILLIGRAM(S): at 05:08

## 2020-01-06 RX ADMIN — SODIUM CHLORIDE 500 MILLILITER(S): 9 INJECTION INTRAMUSCULAR; INTRAVENOUS; SUBCUTANEOUS at 03:22

## 2020-01-06 RX ADMIN — AMLODIPINE BESYLATE 2.5 MILLIGRAM(S): 2.5 TABLET ORAL at 05:08

## 2020-01-06 RX ADMIN — HEPARIN SODIUM 5000 UNIT(S): 5000 INJECTION INTRAVENOUS; SUBCUTANEOUS at 22:20

## 2020-01-06 RX ADMIN — Medication 100 MILLIGRAM(S): at 11:20

## 2020-01-06 RX ADMIN — SENNA PLUS 2 TABLET(S): 8.6 TABLET ORAL at 22:20

## 2020-01-06 RX ADMIN — Medication 650 MILLIGRAM(S): at 13:14

## 2020-01-06 RX ADMIN — AMIODARONE HYDROCHLORIDE 100 MILLIGRAM(S): 400 TABLET ORAL at 05:10

## 2020-01-06 RX ADMIN — HEPARIN SODIUM 5000 UNIT(S): 5000 INJECTION INTRAVENOUS; SUBCUTANEOUS at 05:14

## 2020-01-06 RX ADMIN — Medication 25 MILLIGRAM(S): at 05:08

## 2020-01-06 RX ADMIN — POLYETHYLENE GLYCOL 3350 17 GRAM(S): 17 POWDER, FOR SOLUTION ORAL at 07:53

## 2020-01-06 RX ADMIN — CALCITRIOL 0.25 MICROGRAM(S): 0.5 CAPSULE ORAL at 11:21

## 2020-01-06 RX ADMIN — Medication 4 MILLIGRAM(S): at 18:36

## 2020-01-06 RX ADMIN — METHOCARBAMOL 750 MILLIGRAM(S): 500 TABLET, FILM COATED ORAL at 18:36

## 2020-01-06 RX ADMIN — METHOCARBAMOL 750 MILLIGRAM(S): 500 TABLET, FILM COATED ORAL at 11:20

## 2020-01-06 RX ADMIN — Medication 75 MICROGRAM(S): at 05:10

## 2020-01-06 RX ADMIN — Medication 650 MILLIGRAM(S): at 05:38

## 2020-01-06 RX ADMIN — Medication 650 MILLIGRAM(S): at 11:21

## 2020-01-06 RX ADMIN — CHLORHEXIDINE GLUCONATE 1 APPLICATION(S): 213 SOLUTION TOPICAL at 11:22

## 2020-01-06 RX ADMIN — Medication 200 MILLIGRAM(S): at 22:28

## 2020-01-06 RX ADMIN — Medication 2 MILLIGRAM(S): at 22:20

## 2020-01-06 RX ADMIN — Medication 650 MILLIGRAM(S): at 05:08

## 2020-01-06 RX ADMIN — METHOCARBAMOL 750 MILLIGRAM(S): 500 TABLET, FILM COATED ORAL at 05:08

## 2020-01-06 RX ADMIN — METHOCARBAMOL 750 MILLIGRAM(S): 500 TABLET, FILM COATED ORAL at 22:28

## 2020-01-06 NOTE — PROGRESS NOTE ADULT - ATTENDING COMMENTS
87yo male with PMHx of atrial fibrillation on eliquis and ASA, CAD s/p stents, hypothyroidism, hypertension admitted after fall, sustained T4 vertebral body fracture. Complains of focal pain at that level. Reports increased tremor intermittently. CKD - creatinine 1.8-2.1. Neurosurgery consulted - no acute intervention. Encourage ambulation/OOB, physical therapy, incentive spirometer, DVT ppx. F/U Nephrology outpatient. F/U neurology outpatient. Dispo pending.

## 2020-01-06 NOTE — PROGRESS NOTE ADULT - SUBJECTIVE AND OBJECTIVE BOX
GENERAL SURGERY PROGRESS NOTE     RENELITZY  [address] [place] [address] day :2d  POD:  Procedure:   Surgical Attending: Eldon Velasco  Overnight events: No acute events overnight. Patient tolerating RD without n/v. Pain controlled with multimodal approach.     T(F): 97.1 (20 @ 15:57), Max: 97.1 (20 @ 15:57)  HR: 57 (20 @ 15:57) (56 - 59)  BP: 143/67 (20 @ 15:57) (133/86 - 200/94)  ABP: --  ABP(mean): --  RR: 18 (20 @ 15:57) (18 - 19)  SpO2: 99% (20 @ 06:23) (99% - 99%)      20 @ 07:01  -  20 @ 07:00  --------------------------------------------------------  IN:    sodium chloride 0.9%: 1000 mL  Total IN: 1000 mL    OUT:    Voided: 300 mL  Total OUT: 300 mL    Total NET: 700 mL      20 @ 07:01  -  20 @ 03:09  --------------------------------------------------------  IN:    Oral Fluid: 440 mL    sodium chloride 0.9%: 125 mL    sodium chloride 0.9%.: 825 mL  Total IN: 1390 mL    OUT:    Voided: 200 mL  Total OUT: 200 mL    Total NET: 1190 mL        DIET/FLUIDS: calcitriol   Capsule 0.25 MICROGram(s) Oral daily  sodium chloride 0.9% Bolus 250 milliLiter(s) IV Bolus once  sodium chloride 0.9%. 1000 milliLiter(s) IV Continuous <Continuous>    GI proph:  pantoprazole    Tablet 40 milliGRAM(s) Oral before breakfast    AC/ proph: heparin  Injectable 5000 Unit(s) SubCutaneous every 8 hours    ABx:     PHYSICAL EXAM:  GENERAL: NAD, well-appearing  CHEST/LUNG: Clear to auscultation bilaterally  HEART: Regular rate and rhythm  ABDOMEN: Soft, Nontender, Nondistended;   EXTREMITIES:  No clubbing, cyanosis, or edema      LABS  Labs:  CAPILLARY BLOOD GLUCOSE                              11.1   8.22  )-----------( 93       ( 2020 22:06 )             35.3       Auto Neutrophil %: 73.4 % (20 @ 22:06)  Auto Immature Granulocyte %: 0.4 % (20 @ 22:06)        146  |  113<H>  |  35<H>  ----------------------------<  150<H>  5.0   |  23  |  1.8<H>      Calcium, Total Serum: 9.2 mg/dL (20 @ 22:06)      LFTs:             6.0  | 0.3  | 28       ------------------[69      ( 2020 09:00 )  3.7  | x    | 19          Lipase:19     Amylase:x         Lactate, Blood: 0.9 mmol/L (20 @ 09:00)      Coags:     12.40  ----< 1.08    ( 2020 09:00 )     30.0        CARDIAC MARKERS ( 2020 09:00 )  x     / 0.02 ng/mL / x     / x     / x              Urinalysis Basic - ( 2020 11:05 )    Color: Light Yellow / Appearance: Clear / S.030 / pH: x  Gluc: x / Ketone: Negative  / Bili: Negative / Urobili: <2 mg/dL   Blood: x / Protein: 30 mg/dL / Nitrite: Negative   Leuk Esterase: Negative / RBC: 1 /HPF / WBC 1 /HPF   Sq Epi: x / Non Sq Epi: 0 /HPF / Bacteria: Negative      RADIOLOGY & ADDITIONAL TESTS:  < from: MR Thoracic Spine No Cont (20 @ 18:19) >  IMPRESSION:  Acute nondisplaced T4 fracture as seen on recent CT. Occult mild compression fracture deformities T6 and T7. Small amount of posterior epidural hemorrhage likely from T4 through T6/T7. Severe canal stenosis without cord compression. No cord signal abnormality.    Vertebral body marrow edema L2 and L3 which is adjacent to the endplates and fevers edema from active endplate degenerative change. Occult mild compression fracture is in the differential.          < end of copied text >

## 2020-01-06 NOTE — CONSULT NOTE ADULT - SUBJECTIVE AND OBJECTIVE BOX
HPI:  89 y/o male with PMHx of HTN, HLD, CAD s/p stents on ASA, Hypothyroidism, Afib on Eliquis, Sleep apnea on CPAP, Gout presents to ED from assisted living facility s/p mechanical fall. +HT, -LOC.  Patient states that he tried to  something from the floor and then lost balance and fell backwards on to a table this morning. He is c/o of come pain in the back of his head and some back pain. Denies any chest pain, abdominal pain, or pain in his extremities (04 Jan 2020 13:22)      PAST MEDICAL & SURGICAL HISTORY:  Gout  Atrial fibrillation: CAD WITH 2 STENTS  Hypothyroid  Hypertension  High cholesterol  History of surgery: RIGHT EYE CATARACT EXTRACTION WITH IOL, SPINAL FUSION, LUMBAR DISCETOMY,AP  Sleep apnea      Hospital Course:  Neurosurgery consult appreciated. He has moderate mid back pain. he amb with me 3 feet with a walker with mod assist. He at baseline used a cane. he fell down about once every 2 weeks. He has a history of cervical surgery for a neck injury and has had some mild balance problems since then.   he seems to get loopy with oxycodone. he has mild CRI.  TODAY'S SUBJECTIVE & REVIEW OF SYMPTOMS:     Constitutional WNL   Cardio WNL   Resp WNL   GI WNL  Heme WNL  Endo WNL  Skin WNL  MSK WNL  Neuro WNL  Cognitive WNL  Psych WNL      MEDICATIONS  (STANDING):  acetaminophen   Tablet .. 650 milliGRAM(s) Oral every 6 hours  allopurinol 100 milliGRAM(s) Oral daily  aMIOdarone    Tablet 100 milliGRAM(s) Oral daily  amLODIPine   Tablet 2.5 milliGRAM(s) Oral daily  atorvastatin 20 milliGRAM(s) Oral at bedtime  calcitriol   Capsule 0.25 MICROGram(s) Oral daily  chlorhexidine 4% Liquid 1 Application(s) Topical daily  doxazosin 2 milliGRAM(s) Oral at bedtime  doxazosin 4 milliGRAM(s) Oral daily  heparin  Injectable 5000 Unit(s) SubCutaneous every 8 hours  levothyroxine 75 MICROGram(s) Oral daily  methocarbamol 750 milliGRAM(s) Oral every 6 hours  metoprolol succinate ER 25 milliGRAM(s) Oral daily  pantoprazole    Tablet 40 milliGRAM(s) Oral before breakfast  polyethylene glycol 3350 17 Gram(s) Oral daily  prednisoLONE acetate 1% Suspension 1 Drop(s) Left EYE two times a day  pregabalin 200 milliGRAM(s) Oral three times a day  senna 2 Tablet(s) Oral at bedtime    MEDICATIONS  (PRN):  oxyCODONE    IR 10 milliGRAM(s) Oral every 6 hours PRN Severe Pain (7 - 10)      FAMILY HISTORY:      Allergies    penicillin (Rash)    Intolerances        SOCIAL HISTORY:    [  ] Etoh  [  ] Smoking  [  ] Substance abuse     Home Environment:  [ x ] Home Alone-  [  ] Lives with Family  [  ] Home Health Aid    Dwelling:  [  ] Apartment  [  ] Private House  [ x ] Indep living-[place]  [  ] Skilled Nursing Facility      [  ] Short Term  [  ] Long Term  [  ] Stairs       Elevator [  ]    FUNCTIONAL STATUS PTA: (Check all that apply)  Ambulation: [ x  ]Independent    [  ] Dependent     [  ] Non-Ambulatory  Assistive Device: [x  ] SA Cane  [  ]  Q Cane  [  ] Walker  [  ]  Wheelchair  ADL : [  ] Independent  [  ]  Dependent       Vital Signs Last 24 Hrs  T(C): 36.3 (06 Jan 2020 07:39), Max: 37.2 (06 Jan 2020 00:00)  T(F): 97.3 (06 Jan 2020 07:39), Max: 99 (06 Jan 2020 00:00)  HR: 56 (06 Jan 2020 07:39) (56 - 63)  BP: 141/77 (06 Jan 2020 07:39) (141/63 - 168/61)  BP(mean): --  RR: 18 (06 Jan 2020 07:39) (18 - 18)  SpO2: --      PHYSICAL EXAM: Alert & Oriented X3  GENERAL: NAD, well-groomed, well-developed  HEAD:  Atraumatic, Normocephalic  EYES: EOMI, PERRLA, conjunctiva and sclera clear  NECK: Supple, No JVD, Normal thyroid  CHEST/LUNG: Clear to percussion bilaterally; No rales, rhonchi, wheezing, or rubs  HEART: Regular rate and rhythm; No murmurs, rubs, or gallops  ABDOMEN: Soft, Nontender, Nondistended; Bowel sounds present  EXTREMITIES:  2+ Peripheral Pulses, No clubbing, cyanosis, or edema    NERVOUS SYSTEM:  Cranial Nerves 2-12 intact [  ] Abnormal  [  ]  ROM: WFL all extremities [  ]  Abnormal [  ]  Motor Strength: WFL all extremities  [  ]  Abnormal [x  ]  5-5/ LE's  Sensation: intact to light touch [  ] Abnormal [  ]  Reflexes: Symmetric [  ]  Abnormal [  ]    FUNCTIONAL STATUS:  Bed Mobility: Independent [  ]  Supervision [  ]  Needs Assistance [  ]  N/A [  ]  Transfers: Independent [  ]  Supervision [  ]  Needs Assistance [  ]  N/A [  ]   Ambulation: Independent [  ]  Supervision [  ]  Needs Assistance [  ]  N/A [  ]  ADL: Independent [  ] Requires Assistance [  ] N/A [  ]      LABS:                        11.1   8.22  )-----------( 93       ( 05 Jan 2020 22:06 )             35.3     01-06    144  |  111<H>  |  35<H>  ----------------------------<  120<H>  4.7   |  24  |  1.9<H>    Ca    9.5      06 Jan 2020 05:48  Phos  2.8     01-05  Mg     2.0     01-05            RADIOLOGY & ADDITIONAL STUDIES:    Assesment:

## 2020-01-06 NOTE — DISCHARGE NOTE PROVIDER - CARE PROVIDER_API CALL
Christie Mcdonald)  [facility]  [address] [place] [address], [address]  [place] [address]  Phone: [phone]  Fax: [phone]  Follow Up Time: 2 weeks

## 2020-01-06 NOTE — PROVIDER CONTACT NOTE (OTHER) - ACTION/TREATMENT ORDERED:
provider stated that pt was NPO for MRI which has been done and that he will place a new diet order. provider stated that he will come to speak with pt's family
MD Manning notified and made aware. as per MD, a stool softener will be ordered.

## 2020-01-06 NOTE — DISCHARGE NOTE PROVIDER - NSDCMRMEDTOKEN_GEN_ALL_CORE_FT
acetaminophen 325 mg oral tablet: 2 tab(s) orally every 6 hours  allopurinol 100 mg oral tablet: orally once a day  amiodarone 100 mg oral tablet: tab(s) orally once a day  amLODIPine 2.5 mg oral tablet: 1 tab(s) orally once a day  atorvastatin 20 mg oral tablet: 1 tab(s) orally once a day  CALCITRIOL 0.25 MCG CAPSULE:   heparin: 5000 unit(s) subcutaneously every 8 hours  levothyroxine 75 mcg (0.075 mg) oral tablet: 1 tab(s) orally once a day  losartan 50 mg oral tablet: 1 tab(s) orally 2 times a day  Lyrica 225 mg oral capsule: 1 cap(s) orally 3 times a day  methocarbamol 750 mg oral tablet: 1 tab(s) orally every 6 hours  Metoprolol Succinate ER 25 mg oral tablet, extended release: 1 tab(s) orally once a day  oxyCODONE 5 mg oral tablet: 1 tab(s) orally every 6 hours, As needed, Severe Pain (7 - 10)  pantoprazole 40 mg oral delayed release tablet: 1 tab(s) orally once a day (before a meal)  polyethylene glycol 3350 oral powder for reconstitution: 17 gram(s) orally once a day  prednisoLONE acetate 1% ophthalmic suspension: 1 drop(s) to each affected eye 2 times a day  senna oral tablet: 2 tab(s) orally once a day (at bedtime)  terazosin 5 mg oral tablet: 7 milligram(s) orally once a day

## 2020-01-06 NOTE — DISCHARGE NOTE PROVIDER - NSDCCPCAREPLAN_GEN_ALL_CORE_FT
PRINCIPAL DISCHARGE DIAGNOSIS  Diagnosis: Thoracic spine fracture  Assessment and Plan of Treatment: Your imaging showed:   - acute, non-displaced oblique fracture through anterior aspect of T4 vertebral body  - occult mild compression fracture deformities T6 & T7  - small amount of posterior epidural hemorrhage likely from T4 to T7  - severe canal stenosis without cord compression; no cord signal abnormality  - vertebral body marrow edema at L2-3, adjacent to the endplates  PAIN: Take Tylenol 650mg every 6-8 hours as needed for pain. Take Oxycodone 5mg every 6 hours as needed for breakthrough pain.  MEDICATIONS: Your home Aspirin and Eliquis were held (not given) at the hospital. Discuss with your primary care physician and neurosurgeon when to restart this.  ACTIVITY: Ambulate and engage in physical activity as tolerated.   FOLLOW UP: With Dr. Mcdonald (neurosurgeon) in 2 weeks. Call to schedule an appointment.      SECONDARY DISCHARGE DIAGNOSES  Diagnosis: Facial laceration  Assessment and Plan of Treatment: s/p suture repair by the emergency department.   WOUND CARE: leave sutures in place. You may clean them with soap and water.

## 2020-01-06 NOTE — PROGRESS NOTE ADULT - ASSESSMENT
88M s/p fall at assisted living facility, +HT, -LOC, +eliquis for afib and ASA, found to have T4 vertebral body fracture.     PLAN:   - NSX - ok for OOB, follow up in 2 weeks with NSX  - Tolerating diet  - Cr 2.1 > 1.7  - holding home losartan d/t PITER  - pain management  - encourage IS  - FU hospitalist c/s, PT/rehab

## 2020-01-06 NOTE — DISCHARGE NOTE PROVIDER - HOSPITAL COURSE
88M w/ PMH of HTN, HLD, CAD s/p stents on ASA, hypothyroidism, afib on Eliquis, sleep apnea on CPAP, and gout presented to the ED from assisted living s/p mechanical fall, +HT, -LOC, +eliquis and aspirin. Pt tried to pick something off the floor, lost his balance, and fell backwards onto a table. Pan scan was remarkable for a non-displaced oblique fracture through the anterior aspect of the T4 vertebral body. Neurosurgery was consulted and recommended MRI T-spine with no acute neurosurgical intervention. MRI T-spine showed:         There is obliquely orientated linear T1/T2 hypointense signal through the anterior T4 vertebral body with associated hyperintense signal on the STIR sequence compatible with acute nondisplaced fracture. There is very mild vertebral body height loss at T6 and T7 which have corresponding underlying T2/STIR hyperintense, T1 hypointense signal compatible with edema from occult compression fractures. Similar appearing signal is seen within the bowel to and L3 vertebral bodies, unclear if due to edema from occult compression fracture or related to active endplate degenerative changes, the latter which is favored. The remaining thoracic vertebral heights are maintained. There is no retropulsion of bone into the epidural space.    Small amount of posterior epidural hemorrhage is suggested from T4 through T6 at the 6 to 9:00 position versus more prominent epidural fat without adequate suppression on the STIR. There is no cord compression or cord signal abnormality. The conus medullaris terminates at the T12/L1 level and is unremarkable.    There are multilevel degenerative changes including intervertebral disc space narrowing, multilevel disc desiccation change and small disc herniations at multiple levels. There is associated severe canal stenosis at T6/T7 due to combination of a tiny disc herniation either prominent epidural fat or epidural hemorrhage.        Neurosurgery recommended OOBTC, WBAT, and FU as outpatient 2 weeks following discharge. Pt was evaluated by physiatry, who recommended 4A. At the time of discharge, the pt was medically stable. His pain was controlled and he was able to tolerate a regular diet.

## 2020-01-06 NOTE — CONSULT NOTE ADULT - ASSESSMENT
IMPRESSION: Rehab of    T4, T6, T7 vertebral fxs    PRECAUTIONS: [  ] Cardiac  [  ] Respiratory  [  ] Seizures [  ] Contact Isolation  [  ] Droplet Isolation  [  ] Other    Weight Bearing Status:     RECOMMENDATION:   I suggest he wear a CASH orthosis for his thorasic fxs for pain. For pain, he isn't tolerating oxycodone 10 mg to well. tramadol 50 mg po q6h should be tried. With amb the pain was a 5.    Out of Bed to Chair     DVT/Decubiti Prophylaxis    REHAB PLAN:     [  x ] Bedside P/T 3-5 times a week   [   ]   Bedside O/T  2-3 times a week             [   ] No Rehab Therapy Indicated                   [   ]  Speech Therapy   Conditioning/ROM                                    ADL  Bed Mobility                                               Conditioning/ROM  Transfers                                                     Bed Mobility  Sitting /Standing Balance                         Transfers                                        Gait Training                                               Sitting/Standing Balance  Stair Training [   ]Applicable                    Home equipment Eval                                                                        Splinting  [   ] Only      GOALS:   ADL   [   ]   Independent                    Transfers  [   ] Independent                          Ambulation  [   ] Independent     [    ] With device                            [   ]  CG                                                         [   ]  CG                                                                  [   ] CG                            [    ] Min A                                                   [   ] Min A                                                              [   ] Min  A          DISCHARGE PLAN:   [ xx  ]  Good candidate for Intensive Rehabilitation/Hospital based-4A [facility]                                             Will tolerate 3hrs Intensive Rehab Daily                                       [    ]  Short Term Rehab in Skilled Nursing Facility                                       [    ]  Home with Outpatient or VN services                                         [    ]  Possible Candidate for Intensive Hospital based Rehab

## 2020-01-06 NOTE — DISCHARGE NOTE NURSING/CASE MANAGEMENT/SOCIAL WORK - PATIENT PORTAL LINK FT
You can access the FollowMyHealth Patient Portal offered by [facility] by registering at the following website: [email or web address]. By joining [email or web address]’s FollowMyHealth portal, you will also be able to view your health information using other applications (apps) compatible with our system.

## 2020-01-07 ENCOUNTER — TRANSCRIPTION ENCOUNTER (OUTPATIENT)
Age: 85
End: 2020-01-07

## 2020-01-07 PROBLEM — M10.9 GOUT, UNSPECIFIED: Chronic | Status: ACTIVE | Noted: 2020-01-04

## 2020-01-07 LAB
% ALBUMIN: 54 % — SIGNIFICANT CHANGE UP
% ALPHA 1: 6.5 % — SIGNIFICANT CHANGE UP
% ALPHA 2: 12.2 % — SIGNIFICANT CHANGE UP
% BETA: 12.6 % — SIGNIFICANT CHANGE UP
% GAMMA: 14.7 % — SIGNIFICANT CHANGE UP
ALBUMIN SERPL ELPH-MCNC: 3.2 G/DL — LOW (ref 3.6–5.5)
ALBUMIN SERPL ELPH-MCNC: 3.5 G/DL — SIGNIFICANT CHANGE UP (ref 3.5–5.2)
ALBUMIN/GLOB SERPL ELPH: 1.2 RATIO — SIGNIFICANT CHANGE UP
ALP SERPL-CCNC: 66 U/L — SIGNIFICANT CHANGE UP (ref 30–115)
ALPHA1 GLOB SERPL ELPH-MCNC: 0.4 G/DL — SIGNIFICANT CHANGE UP (ref 0.1–0.4)
ALPHA2 GLOB SERPL ELPH-MCNC: 0.7 G/DL — SIGNIFICANT CHANGE UP (ref 0.5–1)
ALT FLD-CCNC: 19 U/L — SIGNIFICANT CHANGE UP (ref 0–41)
ANION GAP SERPL CALC-SCNC: 9 MMOL/L — SIGNIFICANT CHANGE UP (ref 7–14)
APPEARANCE UR: CLEAR — SIGNIFICANT CHANGE UP
AST SERPL-CCNC: 30 U/L — SIGNIFICANT CHANGE UP (ref 0–41)
B-GLOBULIN SERPL ELPH-MCNC: 0.7 G/DL — SIGNIFICANT CHANGE UP (ref 0.5–1)
BACTERIA # UR AUTO: NEGATIVE — SIGNIFICANT CHANGE UP
BILIRUB SERPL-MCNC: 0.4 MG/DL — SIGNIFICANT CHANGE UP (ref 0.2–1.2)
BILIRUB UR-MCNC: NEGATIVE — SIGNIFICANT CHANGE UP
BUN SERPL-MCNC: 34 MG/DL — HIGH (ref 10–20)
CALCIUM SERPL-MCNC: 10.3 MG/DL — SIGNIFICANT CHANGE UP (ref 8.4–10.5)
CALCIUM SERPL-MCNC: 9.9 MG/DL — SIGNIFICANT CHANGE UP (ref 8.5–10.1)
CHLORIDE SERPL-SCNC: 107 MMOL/L — SIGNIFICANT CHANGE UP (ref 98–110)
CO2 SERPL-SCNC: 27 MMOL/L — SIGNIFICANT CHANGE UP (ref 17–32)
COLOR SPEC: SIGNIFICANT CHANGE UP
CREAT SERPL-MCNC: 1.8 MG/DL — HIGH (ref 0.7–1.5)
DIFF PNL FLD: NEGATIVE — SIGNIFICANT CHANGE UP
EPI CELLS # UR: 0 /HPF — SIGNIFICANT CHANGE UP (ref 0–5)
FOLATE SERPL-MCNC: 7.8 NG/ML — SIGNIFICANT CHANGE UP
GAMMA GLOBULIN: 0.9 G/DL — SIGNIFICANT CHANGE UP (ref 0.6–1.6)
GLUCOSE SERPL-MCNC: 140 MG/DL — HIGH (ref 70–99)
GLUCOSE UR QL: NEGATIVE — SIGNIFICANT CHANGE UP
HCT VFR BLD CALC: 37.3 % — LOW (ref 42–52)
HGB BLD-MCNC: 11.6 G/DL — LOW (ref 14–18)
HYALINE CASTS # UR AUTO: 0 /LPF — SIGNIFICANT CHANGE UP (ref 0–7)
KETONES UR-MCNC: NEGATIVE — SIGNIFICANT CHANGE UP
LEUKOCYTE ESTERASE UR-ACNC: NEGATIVE — SIGNIFICANT CHANGE UP
MCHC RBC-ENTMCNC: 31.1 G/DL — LOW (ref 32–37)
MCHC RBC-ENTMCNC: 32.9 PG — HIGH (ref 27–31)
MCV RBC AUTO: 105.7 FL — HIGH (ref 80–94)
NITRITE UR-MCNC: NEGATIVE — SIGNIFICANT CHANGE UP
NRBC # BLD: 0 /100 WBCS — SIGNIFICANT CHANGE UP (ref 0–0)
PH UR: 6.5 — SIGNIFICANT CHANGE UP (ref 5–8)
PLATELET # BLD AUTO: 90 K/UL — LOW (ref 130–400)
POTASSIUM SERPL-MCNC: 4.6 MMOL/L — SIGNIFICANT CHANGE UP (ref 3.5–5)
POTASSIUM SERPL-SCNC: 4.6 MMOL/L — SIGNIFICANT CHANGE UP (ref 3.5–5)
PROT PATTERN SERPL ELPH-IMP: SIGNIFICANT CHANGE UP
PROT SERPL-MCNC: 5.9 G/DL — LOW (ref 6–8)
PROT SERPL-MCNC: 5.9 G/DL — LOW (ref 6–8.3)
PROT SERPL-MCNC: 5.9 G/DL — LOW (ref 6–8.3)
PROT UR-MCNC: ABNORMAL
PTH-INTACT FLD-MCNC: 83 PG/ML — HIGH (ref 15–65)
RBC # BLD: 3.53 M/UL — LOW (ref 4.7–6.1)
RBC # FLD: 13.4 % — SIGNIFICANT CHANGE UP (ref 11.5–14.5)
RBC CASTS # UR COMP ASSIST: 0 /HPF — SIGNIFICANT CHANGE UP (ref 0–4)
SODIUM SERPL-SCNC: 143 MMOL/L — SIGNIFICANT CHANGE UP (ref 135–146)
SP GR SPEC: 1.01 — SIGNIFICANT CHANGE UP (ref 1.01–1.02)
UROBILINOGEN FLD QL: SIGNIFICANT CHANGE UP
VIT B12 SERPL-MCNC: 646 PG/ML — SIGNIFICANT CHANGE UP (ref 232–1245)
WBC # BLD: 9.1 K/UL — SIGNIFICANT CHANGE UP (ref 4.8–10.8)
WBC # FLD AUTO: 9.1 K/UL — SIGNIFICANT CHANGE UP (ref 4.8–10.8)
WBC UR QL: 0 /HPF — SIGNIFICANT CHANGE UP (ref 0–5)

## 2020-01-07 RX ORDER — MULTIVIT WITH MIN/MFOLATE/K2 340-15/3 G
0.5 POWDER (GRAM) ORAL
Refills: 0 | Status: DISCONTINUED | OUTPATIENT
Start: 2020-01-07 | End: 2020-01-15

## 2020-01-07 RX ADMIN — Medication 1 DROP(S): at 06:10

## 2020-01-07 RX ADMIN — CALCITRIOL 0.25 MICROGRAM(S): 0.5 CAPSULE ORAL at 13:07

## 2020-01-07 RX ADMIN — Medication 200 MILLIGRAM(S): at 06:11

## 2020-01-07 RX ADMIN — HEPARIN SODIUM 5000 UNIT(S): 5000 INJECTION INTRAVENOUS; SUBCUTANEOUS at 13:08

## 2020-01-07 RX ADMIN — ATORVASTATIN CALCIUM 20 MILLIGRAM(S): 80 TABLET, FILM COATED ORAL at 21:29

## 2020-01-07 RX ADMIN — Medication 200 MILLIGRAM(S): at 21:29

## 2020-01-07 RX ADMIN — SENNA PLUS 2 TABLET(S): 8.6 TABLET ORAL at 21:29

## 2020-01-07 RX ADMIN — HEPARIN SODIUM 5000 UNIT(S): 5000 INJECTION INTRAVENOUS; SUBCUTANEOUS at 06:09

## 2020-01-07 RX ADMIN — METHOCARBAMOL 750 MILLIGRAM(S): 500 TABLET, FILM COATED ORAL at 21:29

## 2020-01-07 RX ADMIN — Medication 75 MICROGRAM(S): at 06:07

## 2020-01-07 RX ADMIN — AMLODIPINE BESYLATE 2.5 MILLIGRAM(S): 2.5 TABLET ORAL at 06:08

## 2020-01-07 RX ADMIN — METHOCARBAMOL 750 MILLIGRAM(S): 500 TABLET, FILM COATED ORAL at 06:07

## 2020-01-07 RX ADMIN — Medication 200 MILLIGRAM(S): at 13:12

## 2020-01-07 RX ADMIN — METHOCARBAMOL 750 MILLIGRAM(S): 500 TABLET, FILM COATED ORAL at 13:07

## 2020-01-07 RX ADMIN — METHOCARBAMOL 750 MILLIGRAM(S): 500 TABLET, FILM COATED ORAL at 18:24

## 2020-01-07 RX ADMIN — Medication 1 DROP(S): at 18:25

## 2020-01-07 RX ADMIN — Medication 25 MILLIGRAM(S): at 06:07

## 2020-01-07 RX ADMIN — PANTOPRAZOLE SODIUM 40 MILLIGRAM(S): 20 TABLET, DELAYED RELEASE ORAL at 06:08

## 2020-01-07 RX ADMIN — HEPARIN SODIUM 5000 UNIT(S): 5000 INJECTION INTRAVENOUS; SUBCUTANEOUS at 21:29

## 2020-01-07 RX ADMIN — POLYETHYLENE GLYCOL 3350 17 GRAM(S): 17 POWDER, FOR SOLUTION ORAL at 13:20

## 2020-01-07 RX ADMIN — Medication 2 MILLIGRAM(S): at 21:29

## 2020-01-07 RX ADMIN — Medication 100 MILLIGRAM(S): at 13:08

## 2020-01-07 RX ADMIN — AMIODARONE HYDROCHLORIDE 100 MILLIGRAM(S): 400 TABLET ORAL at 06:07

## 2020-01-07 NOTE — DISCHARGE NOTE NURSING/CASE MANAGEMENT/SOCIAL WORK - PATIENT PORTAL LINK FT
You can access the FollowMyHealth Patient Portal offered by [facility] by registering at the following website: [email or web address]. By joining [employer]’s FollowMyHealth portal, you will also be able to view your health information using other applications (apps) compatible with our system.

## 2020-01-08 RX ORDER — OXYCODONE HYDROCHLORIDE 5 MG/1
5 TABLET ORAL EVERY 6 HOURS
Refills: 0 | Status: DISCONTINUED | OUTPATIENT
Start: 2020-01-08 | End: 2020-01-14

## 2020-01-08 RX ORDER — ASPIRIN/CALCIUM CARB/MAGNESIUM 324 MG
81 TABLET ORAL DAILY
Refills: 0 | Status: DISCONTINUED | OUTPATIENT
Start: 2020-01-08 | End: 2020-01-15

## 2020-01-08 RX ORDER — APIXABAN 2.5 MG/1
2.5 TABLET, FILM COATED ORAL
Refills: 0 | Status: DISCONTINUED | OUTPATIENT
Start: 2020-01-08 | End: 2020-01-15

## 2020-01-08 RX ADMIN — Medication 25 MILLIGRAM(S): at 05:44

## 2020-01-08 RX ADMIN — Medication 200 MILLIGRAM(S): at 05:44

## 2020-01-08 RX ADMIN — APIXABAN 2.5 MILLIGRAM(S): 2.5 TABLET, FILM COATED ORAL at 18:43

## 2020-01-08 RX ADMIN — AMLODIPINE BESYLATE 2.5 MILLIGRAM(S): 2.5 TABLET ORAL at 05:40

## 2020-01-08 RX ADMIN — PANTOPRAZOLE SODIUM 40 MILLIGRAM(S): 20 TABLET, DELAYED RELEASE ORAL at 05:39

## 2020-01-08 RX ADMIN — METHOCARBAMOL 750 MILLIGRAM(S): 500 TABLET, FILM COATED ORAL at 12:54

## 2020-01-08 RX ADMIN — Medication 4 MILLIGRAM(S): at 05:39

## 2020-01-08 RX ADMIN — CALCITRIOL 0.25 MICROGRAM(S): 0.5 CAPSULE ORAL at 12:54

## 2020-01-08 RX ADMIN — Medication 2 MILLIGRAM(S): at 21:46

## 2020-01-08 RX ADMIN — Medication 1 DROP(S): at 05:41

## 2020-01-08 RX ADMIN — ATORVASTATIN CALCIUM 20 MILLIGRAM(S): 80 TABLET, FILM COATED ORAL at 21:46

## 2020-01-08 RX ADMIN — SENNA PLUS 2 TABLET(S): 8.6 TABLET ORAL at 21:46

## 2020-01-08 RX ADMIN — METHOCARBAMOL 750 MILLIGRAM(S): 500 TABLET, FILM COATED ORAL at 17:52

## 2020-01-08 RX ADMIN — Medication 200 MILLIGRAM(S): at 14:18

## 2020-01-08 RX ADMIN — AMIODARONE HYDROCHLORIDE 100 MILLIGRAM(S): 400 TABLET ORAL at 05:40

## 2020-01-08 RX ADMIN — HEPARIN SODIUM 5000 UNIT(S): 5000 INJECTION INTRAVENOUS; SUBCUTANEOUS at 05:41

## 2020-01-08 RX ADMIN — Medication 200 MILLIGRAM(S): at 21:46

## 2020-01-08 RX ADMIN — Medication 75 MICROGRAM(S): at 05:40

## 2020-01-08 RX ADMIN — METHOCARBAMOL 750 MILLIGRAM(S): 500 TABLET, FILM COATED ORAL at 05:39

## 2020-01-08 RX ADMIN — Medication 100 MILLIGRAM(S): at 12:54

## 2020-01-09 RX ORDER — LOSARTAN POTASSIUM 100 MG/1
25 TABLET, FILM COATED ORAL DAILY
Refills: 0 | Status: DISCONTINUED | OUTPATIENT
Start: 2020-01-09 | End: 2020-01-11

## 2020-01-09 RX ADMIN — PANTOPRAZOLE SODIUM 40 MILLIGRAM(S): 20 TABLET, DELAYED RELEASE ORAL at 06:07

## 2020-01-09 RX ADMIN — OXYCODONE HYDROCHLORIDE 5 MILLIGRAM(S): 5 TABLET ORAL at 16:17

## 2020-01-09 RX ADMIN — Medication 2 MILLIGRAM(S): at 21:26

## 2020-01-09 RX ADMIN — Medication 4 MILLIGRAM(S): at 06:07

## 2020-01-09 RX ADMIN — Medication 75 MICROGRAM(S): at 06:07

## 2020-01-09 RX ADMIN — Medication 1 DROP(S): at 06:07

## 2020-01-09 RX ADMIN — Medication 100 MILLIGRAM(S): at 13:20

## 2020-01-09 RX ADMIN — SENNA PLUS 2 TABLET(S): 8.6 TABLET ORAL at 21:26

## 2020-01-09 RX ADMIN — AMIODARONE HYDROCHLORIDE 100 MILLIGRAM(S): 400 TABLET ORAL at 06:06

## 2020-01-09 RX ADMIN — Medication 81 MILLIGRAM(S): at 13:20

## 2020-01-09 RX ADMIN — Medication 1 DROP(S): at 17:13

## 2020-01-09 RX ADMIN — AMLODIPINE BESYLATE 2.5 MILLIGRAM(S): 2.5 TABLET ORAL at 06:06

## 2020-01-09 RX ADMIN — METHOCARBAMOL 750 MILLIGRAM(S): 500 TABLET, FILM COATED ORAL at 06:06

## 2020-01-09 RX ADMIN — APIXABAN 2.5 MILLIGRAM(S): 2.5 TABLET, FILM COATED ORAL at 17:13

## 2020-01-09 RX ADMIN — Medication 200 MILLIGRAM(S): at 21:28

## 2020-01-09 RX ADMIN — Medication 25 MILLIGRAM(S): at 06:07

## 2020-01-09 RX ADMIN — Medication 200 MILLIGRAM(S): at 13:19

## 2020-01-09 RX ADMIN — ATORVASTATIN CALCIUM 20 MILLIGRAM(S): 80 TABLET, FILM COATED ORAL at 21:27

## 2020-01-09 RX ADMIN — CALCITRIOL 0.25 MICROGRAM(S): 0.5 CAPSULE ORAL at 13:20

## 2020-01-09 RX ADMIN — LOSARTAN POTASSIUM 25 MILLIGRAM(S): 100 TABLET, FILM COATED ORAL at 13:19

## 2020-01-09 RX ADMIN — Medication 200 MILLIGRAM(S): at 06:06

## 2020-01-09 RX ADMIN — APIXABAN 2.5 MILLIGRAM(S): 2.5 TABLET, FILM COATED ORAL at 06:07

## 2020-01-10 DIAGNOSIS — I10 ESSENTIAL (PRIMARY) HYPERTENSION: ICD-10-CM

## 2020-01-10 DIAGNOSIS — S00.83XA CONTUSION OF OTHER PART OF HEAD, INITIAL ENCOUNTER: ICD-10-CM

## 2020-01-10 DIAGNOSIS — S01.21XA LACERATION WITHOUT FOREIGN BODY OF NOSE, INITIAL ENCOUNTER: ICD-10-CM

## 2020-01-10 DIAGNOSIS — M1A.9XX0 CHRONIC GOUT, UNSPECIFIED, WITHOUT TOPHUS (TOPHI): ICD-10-CM

## 2020-01-10 DIAGNOSIS — Z98.1 ARTHRODESIS STATUS: ICD-10-CM

## 2020-01-10 DIAGNOSIS — Z95.5 PRESENCE OF CORONARY ANGIOPLASTY IMPLANT AND GRAFT: ICD-10-CM
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
## 2020-01-10 DIAGNOSIS — Z79.01 LONG TERM (CURRENT) USE OF ANTICOAGULANTS: ICD-10-CM

## 2020-01-10 DIAGNOSIS — I48.91 UNSPECIFIED ATRIAL FIBRILLATION: ICD-10-CM

## 2020-01-10 DIAGNOSIS — S22.069A UNSPECIFIED FRACTURE OF T7-T8 VERTEBRA, INITIAL ENCOUNTER FOR CLOSED FRACTURE: ICD-10-CM

## 2020-01-10 DIAGNOSIS — M54.2 CERVICALGIA: ICD-10-CM

## 2020-01-10 DIAGNOSIS — Z99.89 DEPENDENCE ON OTHER ENABLING MACHINES AND DEVICES: ICD-10-CM

## 2020-01-10 DIAGNOSIS — M48.04 SPINAL STENOSIS, THORACIC REGION: ICD-10-CM

## 2020-01-10 DIAGNOSIS — S22.049A UNSPECIFIED FRACTURE OF FOURTH THORACIC VERTEBRA, INITIAL ENCOUNTER FOR CLOSED FRACTURE: ICD-10-CM

## 2020-01-10 DIAGNOSIS — M51.24 OTHER INTERVERTEBRAL DISC DISPLACEMENT, THORACIC REGION: ICD-10-CM

## 2020-01-10 DIAGNOSIS — S22.059A UNSPECIFIED FRACTURE OF T5-T6 VERTEBRA, INITIAL ENCOUNTER FOR CLOSED FRACTURE: ICD-10-CM

## 2020-01-10 DIAGNOSIS — G47.30 SLEEP APNEA, UNSPECIFIED: ICD-10-CM

## 2020-01-10 DIAGNOSIS — E03.9 HYPOTHYROIDISM, UNSPECIFIED: ICD-10-CM
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
## 2020-01-10 LAB
HCT VFR BLD CALC: 34.8 % — LOW (ref 42–52)
HGB BLD-MCNC: 11.2 G/DL — LOW (ref 14–18)
MCHC RBC-ENTMCNC: 32.2 G/DL — SIGNIFICANT CHANGE UP (ref 32–37)
MCHC RBC-ENTMCNC: 34 PG — HIGH (ref 27–31)
MCV RBC AUTO: 105.8 FL — HIGH (ref 80–94)
NRBC # BLD: 0 /100 WBCS — SIGNIFICANT CHANGE UP (ref 0–0)
PLATELET # BLD AUTO: 95 K/UL — LOW (ref 130–400)
RBC # BLD: 3.29 M/UL — LOW (ref 4.7–6.1)
RBC # FLD: 13.6 % — SIGNIFICANT CHANGE UP (ref 11.5–14.5)
WBC # BLD: 6.21 K/UL — SIGNIFICANT CHANGE UP (ref 4.8–10.8)
WBC # FLD AUTO: 6.21 K/UL — SIGNIFICANT CHANGE UP (ref 4.8–10.8)

## 2020-01-10 RX ADMIN — SENNA PLUS 2 TABLET(S): 8.6 TABLET ORAL at 21:01

## 2020-01-10 RX ADMIN — LOSARTAN POTASSIUM 25 MILLIGRAM(S): 100 TABLET, FILM COATED ORAL at 06:19

## 2020-01-10 RX ADMIN — ATORVASTATIN CALCIUM 20 MILLIGRAM(S): 80 TABLET, FILM COATED ORAL at 21:01

## 2020-01-10 RX ADMIN — Medication 25 MILLIGRAM(S): at 06:23

## 2020-01-10 RX ADMIN — AMIODARONE HYDROCHLORIDE 100 MILLIGRAM(S): 400 TABLET ORAL at 06:18

## 2020-01-10 RX ADMIN — CALCITRIOL 0.25 MICROGRAM(S): 0.5 CAPSULE ORAL at 11:40

## 2020-01-10 RX ADMIN — Medication 200 MILLIGRAM(S): at 21:01

## 2020-01-10 RX ADMIN — PANTOPRAZOLE SODIUM 40 MILLIGRAM(S): 20 TABLET, DELAYED RELEASE ORAL at 06:19

## 2020-01-10 RX ADMIN — OXYCODONE HYDROCHLORIDE 5 MILLIGRAM(S): 5 TABLET ORAL at 21:01

## 2020-01-10 RX ADMIN — Medication 4 MILLIGRAM(S): at 06:19

## 2020-01-10 RX ADMIN — APIXABAN 2.5 MILLIGRAM(S): 2.5 TABLET, FILM COATED ORAL at 06:19

## 2020-01-10 RX ADMIN — Medication 81 MILLIGRAM(S): at 11:40

## 2020-01-10 RX ADMIN — APIXABAN 2.5 MILLIGRAM(S): 2.5 TABLET, FILM COATED ORAL at 17:03

## 2020-01-10 RX ADMIN — OXYCODONE HYDROCHLORIDE 5 MILLIGRAM(S): 5 TABLET ORAL at 22:00

## 2020-01-10 RX ADMIN — Medication 2 MILLIGRAM(S): at 21:02

## 2020-01-10 RX ADMIN — Medication 200 MILLIGRAM(S): at 07:14

## 2020-01-10 RX ADMIN — AMLODIPINE BESYLATE 2.5 MILLIGRAM(S): 2.5 TABLET ORAL at 06:19

## 2020-01-10 RX ADMIN — Medication 200 MILLIGRAM(S): at 12:39

## 2020-01-10 RX ADMIN — Medication 1 DROP(S): at 17:06

## 2020-01-10 RX ADMIN — Medication 1 DROP(S): at 06:22

## 2020-01-10 RX ADMIN — Medication 75 MICROGRAM(S): at 06:19

## 2020-01-10 RX ADMIN — Medication 100 MILLIGRAM(S): at 11:40

## 2020-01-11 RX ADMIN — Medication 25 MILLIGRAM(S): at 06:36

## 2020-01-11 RX ADMIN — Medication 1 DROP(S): at 17:02

## 2020-01-11 RX ADMIN — Medication 100 MILLIGRAM(S): at 10:41

## 2020-01-11 RX ADMIN — AMLODIPINE BESYLATE 2.5 MILLIGRAM(S): 2.5 TABLET ORAL at 06:37

## 2020-01-11 RX ADMIN — Medication 81 MILLIGRAM(S): at 10:42

## 2020-01-11 RX ADMIN — OXYCODONE HYDROCHLORIDE 5 MILLIGRAM(S): 5 TABLET ORAL at 04:10

## 2020-01-11 RX ADMIN — Medication 200 MILLIGRAM(S): at 21:40

## 2020-01-11 RX ADMIN — OXYCODONE HYDROCHLORIDE 5 MILLIGRAM(S): 5 TABLET ORAL at 03:33

## 2020-01-11 RX ADMIN — LOSARTAN POTASSIUM 25 MILLIGRAM(S): 100 TABLET, FILM COATED ORAL at 06:28

## 2020-01-11 RX ADMIN — Medication 75 MICROGRAM(S): at 06:28

## 2020-01-11 RX ADMIN — OXYCODONE HYDROCHLORIDE 5 MILLIGRAM(S): 5 TABLET ORAL at 22:46

## 2020-01-11 RX ADMIN — Medication 200 MILLIGRAM(S): at 06:30

## 2020-01-11 RX ADMIN — SENNA PLUS 2 TABLET(S): 8.6 TABLET ORAL at 21:35

## 2020-01-11 RX ADMIN — Medication 200 MILLIGRAM(S): at 17:00

## 2020-01-11 RX ADMIN — CALCITRIOL 0.25 MICROGRAM(S): 0.5 CAPSULE ORAL at 10:42

## 2020-01-11 RX ADMIN — APIXABAN 2.5 MILLIGRAM(S): 2.5 TABLET, FILM COATED ORAL at 17:02

## 2020-01-11 RX ADMIN — AMIODARONE HYDROCHLORIDE 100 MILLIGRAM(S): 400 TABLET ORAL at 06:37

## 2020-01-11 RX ADMIN — Medication 2 MILLIGRAM(S): at 21:35

## 2020-01-11 RX ADMIN — PANTOPRAZOLE SODIUM 40 MILLIGRAM(S): 20 TABLET, DELAYED RELEASE ORAL at 06:28

## 2020-01-11 RX ADMIN — ATORVASTATIN CALCIUM 20 MILLIGRAM(S): 80 TABLET, FILM COATED ORAL at 21:35

## 2020-01-11 RX ADMIN — Medication 1 DROP(S): at 06:27

## 2020-01-11 RX ADMIN — Medication 4 MILLIGRAM(S): at 06:28

## 2020-01-11 RX ADMIN — APIXABAN 2.5 MILLIGRAM(S): 2.5 TABLET, FILM COATED ORAL at 06:34

## 2020-01-12 RX ADMIN — Medication 2 MILLIGRAM(S): at 21:31

## 2020-01-12 RX ADMIN — APIXABAN 2.5 MILLIGRAM(S): 2.5 TABLET, FILM COATED ORAL at 16:44

## 2020-01-12 RX ADMIN — Medication 200 MILLIGRAM(S): at 21:39

## 2020-01-12 RX ADMIN — APIXABAN 2.5 MILLIGRAM(S): 2.5 TABLET, FILM COATED ORAL at 06:23

## 2020-01-12 RX ADMIN — OXYCODONE HYDROCHLORIDE 5 MILLIGRAM(S): 5 TABLET ORAL at 08:03

## 2020-01-12 RX ADMIN — Medication 75 MICROGRAM(S): at 06:24

## 2020-01-12 RX ADMIN — Medication 25 MILLIGRAM(S): at 06:27

## 2020-01-12 RX ADMIN — Medication 1 DROP(S): at 06:25

## 2020-01-12 RX ADMIN — Medication 81 MILLIGRAM(S): at 14:05

## 2020-01-12 RX ADMIN — AMIODARONE HYDROCHLORIDE 100 MILLIGRAM(S): 400 TABLET ORAL at 06:23

## 2020-01-12 RX ADMIN — ATORVASTATIN CALCIUM 20 MILLIGRAM(S): 80 TABLET, FILM COATED ORAL at 21:31

## 2020-01-12 RX ADMIN — CALCITRIOL 0.25 MICROGRAM(S): 0.5 CAPSULE ORAL at 14:06

## 2020-01-12 RX ADMIN — Medication 4 MILLIGRAM(S): at 06:22

## 2020-01-12 RX ADMIN — AMLODIPINE BESYLATE 2.5 MILLIGRAM(S): 2.5 TABLET ORAL at 06:23

## 2020-01-12 RX ADMIN — Medication 1 DROP(S): at 16:43

## 2020-01-12 RX ADMIN — Medication 200 MILLIGRAM(S): at 15:34

## 2020-01-12 RX ADMIN — Medication 200 MILLIGRAM(S): at 06:37

## 2020-01-12 RX ADMIN — PANTOPRAZOLE SODIUM 40 MILLIGRAM(S): 20 TABLET, DELAYED RELEASE ORAL at 06:24

## 2020-01-12 RX ADMIN — SENNA PLUS 2 TABLET(S): 8.6 TABLET ORAL at 21:31

## 2020-01-12 RX ADMIN — Medication 100 MILLIGRAM(S): at 14:06

## 2020-01-13 PROCEDURE — 93010 ELECTROCARDIOGRAM REPORT: CPT

## 2020-01-13 RX ORDER — LANOLIN ALCOHOL/MO/W.PET/CERES
3 CREAM (GRAM) TOPICAL AT BEDTIME
Refills: 0 | Status: DISCONTINUED | OUTPATIENT
Start: 2020-01-13 | End: 2020-01-15

## 2020-01-13 RX ORDER — METOPROLOL TARTRATE 50 MG
25 TABLET ORAL DAILY
Refills: 0 | Status: DISCONTINUED | OUTPATIENT
Start: 2020-01-13 | End: 2020-01-15

## 2020-01-13 RX ADMIN — Medication 75 MICROGRAM(S): at 06:26

## 2020-01-13 RX ADMIN — Medication 200 MILLIGRAM(S): at 06:49

## 2020-01-13 RX ADMIN — Medication 1 DROP(S): at 06:27

## 2020-01-13 RX ADMIN — APIXABAN 2.5 MILLIGRAM(S): 2.5 TABLET, FILM COATED ORAL at 06:26

## 2020-01-13 RX ADMIN — APIXABAN 2.5 MILLIGRAM(S): 2.5 TABLET, FILM COATED ORAL at 17:25

## 2020-01-13 RX ADMIN — Medication 4 MILLIGRAM(S): at 06:26

## 2020-01-13 RX ADMIN — Medication 200 MILLIGRAM(S): at 14:03

## 2020-01-13 RX ADMIN — AMLODIPINE BESYLATE 2.5 MILLIGRAM(S): 2.5 TABLET ORAL at 06:26

## 2020-01-13 RX ADMIN — Medication 3 MILLIGRAM(S): at 21:14

## 2020-01-13 RX ADMIN — Medication 100 MILLIGRAM(S): at 12:24

## 2020-01-13 RX ADMIN — CALCITRIOL 0.25 MICROGRAM(S): 0.5 CAPSULE ORAL at 12:24

## 2020-01-13 RX ADMIN — AMIODARONE HYDROCHLORIDE 100 MILLIGRAM(S): 400 TABLET ORAL at 06:26

## 2020-01-13 RX ADMIN — Medication 25 MILLIGRAM(S): at 06:49

## 2020-01-13 RX ADMIN — PANTOPRAZOLE SODIUM 40 MILLIGRAM(S): 20 TABLET, DELAYED RELEASE ORAL at 06:26

## 2020-01-13 RX ADMIN — Medication 2 MILLIGRAM(S): at 21:13

## 2020-01-13 RX ADMIN — Medication 1 DROP(S): at 17:25

## 2020-01-13 RX ADMIN — Medication 81 MILLIGRAM(S): at 12:24

## 2020-01-13 RX ADMIN — ATORVASTATIN CALCIUM 20 MILLIGRAM(S): 80 TABLET, FILM COATED ORAL at 21:13

## 2020-01-13 RX ADMIN — Medication 200 MILLIGRAM(S): at 21:13

## 2020-01-14 ENCOUNTER — TRANSCRIPTION ENCOUNTER (OUTPATIENT)
Age: 85
End: 2020-01-14

## 2020-01-14 RX ORDER — LOSARTAN POTASSIUM 100 MG/1
25 TABLET, FILM COATED ORAL ONCE
Refills: 0 | Status: COMPLETED | OUTPATIENT
Start: 2020-01-14 | End: 2020-01-14

## 2020-01-14 RX ORDER — LOSARTAN POTASSIUM 100 MG/1
25 TABLET, FILM COATED ORAL DAILY
Refills: 0 | Status: DISCONTINUED | OUTPATIENT
Start: 2020-01-14 | End: 2020-01-15

## 2020-01-14 RX ORDER — OXYCODONE HYDROCHLORIDE 5 MG/1
5 TABLET ORAL EVERY 6 HOURS
Refills: 0 | Status: DISCONTINUED | OUTPATIENT
Start: 2020-01-14 | End: 2020-01-15

## 2020-01-14 RX ADMIN — APIXABAN 2.5 MILLIGRAM(S): 2.5 TABLET, FILM COATED ORAL at 17:17

## 2020-01-14 RX ADMIN — PANTOPRAZOLE SODIUM 40 MILLIGRAM(S): 20 TABLET, DELAYED RELEASE ORAL at 05:54

## 2020-01-14 RX ADMIN — LOSARTAN POTASSIUM 25 MILLIGRAM(S): 100 TABLET, FILM COATED ORAL at 17:39

## 2020-01-14 RX ADMIN — Medication 100 MILLIGRAM(S): at 12:37

## 2020-01-14 RX ADMIN — Medication 1 DROP(S): at 05:55

## 2020-01-14 RX ADMIN — APIXABAN 2.5 MILLIGRAM(S): 2.5 TABLET, FILM COATED ORAL at 05:54

## 2020-01-14 RX ADMIN — POLYETHYLENE GLYCOL 3350 17 GRAM(S): 17 POWDER, FOR SOLUTION ORAL at 12:49

## 2020-01-14 RX ADMIN — AMIODARONE HYDROCHLORIDE 100 MILLIGRAM(S): 400 TABLET ORAL at 05:48

## 2020-01-14 RX ADMIN — Medication 200 MILLIGRAM(S): at 05:48

## 2020-01-14 RX ADMIN — Medication 81 MILLIGRAM(S): at 12:37

## 2020-01-14 RX ADMIN — Medication 200 MILLIGRAM(S): at 21:35

## 2020-01-14 RX ADMIN — ATORVASTATIN CALCIUM 20 MILLIGRAM(S): 80 TABLET, FILM COATED ORAL at 21:34

## 2020-01-14 RX ADMIN — Medication 1 DROP(S): at 17:17

## 2020-01-14 RX ADMIN — SENNA PLUS 2 TABLET(S): 8.6 TABLET ORAL at 21:34

## 2020-01-14 RX ADMIN — CALCITRIOL 0.25 MICROGRAM(S): 0.5 CAPSULE ORAL at 12:37

## 2020-01-14 RX ADMIN — Medication 4 MILLIGRAM(S): at 05:54

## 2020-01-14 RX ADMIN — Medication 75 MICROGRAM(S): at 05:54

## 2020-01-14 RX ADMIN — Medication 200 MILLIGRAM(S): at 14:53

## 2020-01-14 RX ADMIN — Medication 3 MILLIGRAM(S): at 21:35

## 2020-01-14 RX ADMIN — OXYCODONE HYDROCHLORIDE 5 MILLIGRAM(S): 5 TABLET ORAL at 06:59

## 2020-01-14 RX ADMIN — AMLODIPINE BESYLATE 2.5 MILLIGRAM(S): 2.5 TABLET ORAL at 05:54

## 2020-01-14 RX ADMIN — Medication 2 MILLIGRAM(S): at 21:35

## 2020-01-14 RX ADMIN — OXYCODONE HYDROCHLORIDE 5 MILLIGRAM(S): 5 TABLET ORAL at 07:00

## 2020-01-14 NOTE — DISCHARGE NOTE PROVIDER - NSDCCPCAREPLAN_GEN_ALL_CORE_FT
PRINCIPAL DISCHARGE DIAGNOSIS  Diagnosis: Thoracic vertebral fracture  Assessment and Plan of Treatment:       SECONDARY DISCHARGE DIAGNOSES  Diagnosis: History of atrial fibrillation  Assessment and Plan of Treatment:     Diagnosis: History of bradycardia  Assessment and Plan of Treatment: PRINCIPAL DISCHARGE DIAGNOSIS  Diagnosis: Thoracic vertebral fracture  Assessment and Plan of Treatment: Continue mediations as prescribed.   Please follow up with your primary care physician in 1 week for post-discharge follow up.      SECONDARY DISCHARGE DIAGNOSES  Diagnosis: History of atrial fibrillation  Assessment and Plan of Treatment:     Diagnosis: History of bradycardia  Assessment and Plan of Treatment: Please follow up with your cardiologist in 1 week.

## 2020-01-14 NOTE — DISCHARGE NOTE PROVIDER - PROVIDER TOKENS
PROVIDER:[TOKEN:[[id number]],FOLLOWUP:[1 week]],PROVIDER:[TOKEN:[[id number]],FOLLOWUP:[1 week]]

## 2020-01-14 NOTE — DISCHARGE NOTE PROVIDER - HOSPITAL COURSE
88 year old male with pmh of HTN, HLD, CAD s/p stens on ASA, hypothyroidism, afib on eliquis, sleep apnea on CPAP, gout presented to the ED from assisted living s/p mechanical fall. Patient had head trauma but no loss of consciousness. He had a nasal laceration which was sutured in the ED.  As per patient he tried to pick something lost his balance and fell backward. CT revealed non displaced oblique fracture through the anterior aspect of the T4 vertebral body. Patient underwent MRI which should showed:        "Acute nondisplaced T4 fracture as seen on recent CT. Occult mild compression fracture deformities T6 and T7. Small amount of posterior epidural hemorrhage likely from T4 through T6/T7. Severe canal stenosis without cord compression. No cord signal abnormality.        Vertebral body marrow edema L2 and L3 which is adjacent to the endplates and fevers edema from active endplate degenerative change. Occult mild compression fracture is in the differential."        Neurosurgery was conuslted who recommended no surgical intervention needed. Patient was accepted to PM&R for thoracic vertebral fracture.         Prior functional status: independent with ADLs, independent in ambulation with SC    Physical Exam    PHYSICAL EXAM: Alert & Oriented X3    GENERAL: NAD, well-groomed, well-developed    HEAD:  Atraumatic, Normocephalic    EYES: EOMI, PERRLA, conjunctiva and sclera clear    NECK: Supple, No JVD, Normal thyroid    CHEST/LUNG: Clear to percussion bilaterally; No rales, rhonchi, wheezing, or rubs    HEART: Regular rate and rhythm; No murmurs, rubs, or gallops    ABDOMEN: Soft, Nontender, Nondistended; Bowel sounds present    EXTREMITIES:  2+ Peripheral Pulses, No clubbing, cyanosis, or edema        NERVOUS SYSTEM:  Cranial Nerves 2-12 intact     ROM: WFL all extremities     Motor Strength:  5-5/ LE's. 5/5 UE    Sensation: intact to light touch     Reflexes: Symmetric         Rehab Course: The patient was admitted to the acute inpatient rehab unit presenting with a decline in functional status. The patient participated in three hours of multidisciplinary therapy 5-6 days per week. The patient was continued on all home medications or equivalent alternatives as deemed appropriate. The patient received prophylactic anticoagulation medication and was monitored closely with no complications. During the stay on the inpatient unit, the patient showed as much progress as had been anticipated and was cleared for discharge by a multidisciplinary team.    During rehab course patient was found to have asymptomatic bradycardia often at 45 beats per minute. All other vitals were within normal limited.Cardiology was consulted on recommendations for adjustment for his rate control medications for afib in regards to his persistent bradycardia. Patient CBC was monitored routinely for thrombocytopenia which remained stable through the course of rehab. Per Dr. Beckman his private cardiologist, amiodarone was stopped.        Discharge functional status:    PT:     OT:         Discharge disposition:  Assisted Living center 88 year old male with pmh of HTN, HLD, CAD s/p stens on ASA, hypothyroidism, afib on eliquis, sleep apnea on CPAP, gout presented to the ED from assisted living s/p mechanical fall. Patient had head trauma but no loss of consciousness. He had a nasal laceration which was sutured in the ED.  As per patient he tried to pick something lost his balance and fell backward. CT revealed non displaced oblique fracture through the anterior aspect of the T4 vertebral body. Patient underwent MRI which should showed:        "Acute nondisplaced T4 fracture as seen on recent CT. Occult mild compression fracture deformities T6 and T7. Small amount of posterior epidural hemorrhage likely from T4 through T6/T7. Severe canal stenosis without cord compression. No cord signal abnormality.        Vertebral body marrow edema L2 and L3 which is adjacent to the endplates and fevers edema from active endplate degenerative change. Occult mild compression fracture is in the differential."        Neurosurgery was conuslted who recommended no surgical intervention needed. Patient was accepted to PM&R for thoracic vertebral fracture.         Prior functional status: independent with ADLs, independent in ambulation with SC    Physical Exam    PHYSICAL EXAM: Alert & Oriented X3    GENERAL: NAD, well-groomed, well-developed    HEAD:  Atraumatic, Normocephalic    EYES: EOMI, PERRLA, conjunctiva and sclera clear    NECK: Supple, No JVD, Normal thyroid    CHEST/LUNG: Clear to percussion bilaterally; No rales, rhonchi, wheezing, or rubs    HEART: Regular rate and rhythm; No murmurs, rubs, or gallops    ABDOMEN: Soft, Nontender, Nondistended; Bowel sounds present    EXTREMITIES:  2+ Peripheral Pulses, No clubbing, cyanosis, or edema        NERVOUS SYSTEM:  Cranial Nerves 2-12 intact     ROM: WFL all extremities     Motor Strength:  5-5/ LE's. 5/5 UE    Sensation: intact to light touch     Reflexes: Symmetric         Rehab Course: The patient was admitted to the acute inpatient rehab unit presenting with a decline in functional status. The patient participated in three hours of multidisciplinary therapy 5-6 days per week. The patient was continued on all home medications or equivalent alternatives as deemed appropriate. The patient received prophylactic anticoagulation medication and was monitored closely with no complications. During the stay on the inpatient unit, the patient showed as much progress as had been anticipated and was cleared for discharge by a multidisciplinary team.    During rehab course patient was found to have asymptomatic bradycardia often at 45 beats per minute. All other vitals were within normal limited.Cardiology was consulted on recommendations for adjustment for his rate control medications for afib in regards to his persistent bradycardia. Patient CBC was monitored routinely for thrombocytopenia which remained stable through the course of rehab. Per Dr. Beckman his private cardiologist, amiodarone was stopped.        Discharge functional status:    PT: Supervision with bed mobility and transfers. Ambulates 150 ft using RW with minimal assistance.     OT: Touching assist for functional mobility. Supervision with rollator for sit to stand and standing pivot transfer.         Discharge disposition:  Assisted Living center 88 year old male with pmh of HTN, HLD, CAD s/p stens on ASA, hypothyroidism, afib on eliquis, sleep apnea on CPAP, gout presented to the ED from assisted living s/p mechanical fall. Patient had head trauma but no loss of consciousness. He had a nasal laceration which was sutured in the ED.  As per patient he tried to pick something lost his balance and fell backward. CT revealed non displaced oblique fracture through the anterior aspect of the T4 vertebral body. Patient underwent MRI which should showed:        "Acute nondisplaced T4 fracture as seen on recent CT. Occult mild compression fracture deformities T6 and T7. Small amount of posterior epidural hemorrhage likely from T4 through T6/T7. Severe canal stenosis without cord compression. No cord signal abnormality.        Vertebral body marrow edema L2 and L3 which is adjacent to the endplates and fevers edema from active endplate degenerative change. Occult mild compression fracture is in the differential."        Neurosurgery was conuslted who recommended no surgical intervention needed. Patient was accepted to PM&R for thoracic vertebral fracture.         Prior functional status: independent with ADLs, independent in ambulation with SC    Physical Exam    PHYSICAL EXAM: Alert & Oriented X3    GENERAL: NAD, well-groomed, well-developed    HEAD:  Atraumatic, Normocephalic    EYES: EOMI, PERRLA, conjunctiva and sclera clear    NECK: Supple, No JVD, Normal thyroid    CHEST/LUNG: Clear to percussion bilaterally; No rales, rhonchi, wheezing, or rubs    HEART: Regular rate and rhythm; No murmurs, rubs, or gallops    ABDOMEN: Soft, Nontender, Nondistended; Bowel sounds present    EXTREMITIES:  2+ Peripheral Pulses, No clubbing, cyanosis, or edema        NERVOUS SYSTEM:  Cranial Nerves 2-12 intact     ROM: WFL all extremities     Motor Strength:  5-5/ LE's. 5/5 UE    Sensation: intact to light touch     Reflexes: Symmetric         Rehab Course: The patient was admitted to the acute inpatient rehab unit presenting with a decline in functional status. The patient participated in three hours of multidisciplinary therapy 5-6 days per week. The patient was continued on all home medications or equivalent alternatives as deemed appropriate. The patient received prophylactic anticoagulation medication and was monitored closely with no complications. During the stay on the inpatient unit, the patient showed as much progress as had been anticipated and was cleared for discharge by a multidisciplinary team.    During rehab course patient was found to have asymptomatic bradycardia often at 45 beats per minute. All other vitals were within normal limited.Cardiology was consulted on recommendations for adjustment for his rate control medications for afib in regards to his persistent bradycardia. Patient CBC was monitored routinely for thrombocytopenia which remained stable through the course of rehab. Per Dr. Beckman his private cardiologist, amiodarone was stopped as thyroid function worsened and patient found to have persistent candis cardia.         Discharge functional status:    PT: Supervision with bed mobility and transfers. Ambulates 150 ft using RW with minimal assistance.     OT: Touching assist for functional mobility. Supervision with rollator for sit to stand and standing pivot transfer.         Discharge disposition:  Assisted Living center

## 2020-01-14 NOTE — DISCHARGE NOTE PROVIDER - CARE PROVIDERS DIRECT ADDRESSES
[email or web address]

## 2020-01-14 NOTE — DISCHARGE NOTE PROVIDER - CARE PROVIDER_API CALL
Urbano Pearce)  Internal Medicine  [address] [place] [address]  Phone: [phone]  Fax: [phone]  Follow Up Time: 1 week    Rd Baker)  Cardiology; Interventional Cardiology  [address] [facility], [address]  [place] [address]  Phone: [phone]  Fax: [phone]  Follow Up Time: 1 week

## 2020-01-14 NOTE — DISCHARGE NOTE PROVIDER - NSDCFUADDAPPT_GEN_ALL_CORE_FT
Rd Baker MD  [address], [place] [address]  Phone: [phone]  Cardiology  Please Follow up within 2 weeks

## 2020-01-14 NOTE — DISCHARGE NOTE PROVIDER - NSDCMRMEDTOKEN_GEN_ALL_CORE_FT
acetaminophen 325 mg oral tablet: 2 tab(s) orally every 6 hours  allopurinol 100 mg oral tablet: orally once a day  amiodarone 100 mg oral tablet: tab(s) orally once a day  amLODIPine 2.5 mg oral tablet: 1 tab(s) orally once a day  atorvastatin 20 mg oral tablet: 1 tab(s) orally once a day  CALCITRIOL 0.25 MCG CAPSULE:   heparin: 5000 unit(s) subcutaneously every 8 hours  levothyroxine 75 mcg (0.075 mg) oral tablet: 1 tab(s) orally once a day  losartan 50 mg oral tablet: 1 tab(s) orally 2 times a day  Lyrica 225 mg oral capsule: 1 cap(s) orally 3 times a day  methocarbamol 750 mg oral tablet: 1 tab(s) orally every 6 hours  Metoprolol Succinate ER 25 mg oral tablet, extended release: 1 tab(s) orally once a day  oxyCODONE 5 mg oral tablet: 1 tab(s) orally every 6 hours, As needed, Severe Pain (7 - 10)  pantoprazole 40 mg oral delayed release tablet: 1 tab(s) orally once a day (before a meal)  polyethylene glycol 3350 oral powder for reconstitution: 17 gram(s) orally once a day  prednisoLONE acetate 1% ophthalmic suspension: 1 drop(s) to each affected eye 2 times a day  senna oral tablet: 2 tab(s) orally once a day (at bedtime)  terazosin 5 mg oral tablet: 7 milligram(s) orally once a day allopurinol 100 mg oral tablet: orally once a day  amLODIPine 2.5 mg oral tablet: 1 tab(s) orally once a day  apixaban 2.5 mg oral tablet: 1 tab(s) orally 2 times a day  Aspir 81 oral delayed release tablet: 1 tab(s) orally once a day  atorvastatin 20 mg oral tablet: 1 tab(s) orally once a day at night  CALCITRIOL 0.25 MCG CAPSULE:   levothyroxine 75 mcg (0.075 mg) oral tablet: 1 tab(s) orally once a day  losartan 25 mg oral tablet: 1 tab(s) orally once a day  Lyrica 225 mg oral capsule: 1 cap(s) orally 3 times a day  Metoprolol Succinate ER 25 mg oral tablet, extended release: 1 tab(s) orally once a day  oxyCODONE 5 mg oral tablet: 1 tab(s) orally every 6 hours, As needed, Severe Pain (7 - 10) MDD:4 tabs  terazosin 2 mg oral capsule: 1 cap(s) orally once a day (at bedtime)   terazosin 5 mg oral tablet: 5 milligram(s) orally once a day in the morning  terazosin 5 mg oral tablet: 5 milligram(s) orally once a day in the morning

## 2020-01-15 LAB
24R-OH-CALCIDIOL SERPL-MCNC: 34 NG/ML — SIGNIFICANT CHANGE UP (ref 30–80)
TSH SERPL-MCNC: 5.15 UIU/ML — HIGH (ref 0.27–4.2)

## 2020-01-15 RX ORDER — TERAZOSIN HYDROCHLORIDE 10 MG/1
5 CAPSULE ORAL
Qty: 30 | Refills: 0
Start: 2020-01-15 | End: 2020-02-13

## 2020-01-15 RX ORDER — TERAZOSIN HYDROCHLORIDE 10 MG/1
7 CAPSULE ORAL
Qty: 0 | Refills: 0 | DISCHARGE

## 2020-01-15 RX ORDER — METOPROLOL TARTRATE 50 MG
1 TABLET ORAL
Qty: 30 | Refills: 0
Start: 2020-01-15 | End: 2020-02-13

## 2020-01-15 RX ORDER — AMIODARONE HYDROCHLORIDE 400 MG/1
0 TABLET ORAL
Qty: 0 | Refills: 0 | DISCHARGE

## 2020-01-15 RX ORDER — ATORVASTATIN CALCIUM 80 MG/1
1 TABLET, FILM COATED ORAL
Qty: 0 | Refills: 0 | DISCHARGE

## 2020-01-15 RX ORDER — OXYCODONE HYDROCHLORIDE 5 MG/1
1 TABLET ORAL
Qty: 20 | Refills: 0
Start: 2020-01-15

## 2020-01-15 RX ORDER — TERAZOSIN HYDROCHLORIDE 10 MG/1
1 CAPSULE ORAL
Qty: 30 | Refills: 0
Start: 2020-01-15 | End: 2020-02-13

## 2020-01-15 RX ORDER — APIXABAN 2.5 MG/1
1 TABLET, FILM COATED ORAL
Qty: 0 | Refills: 0 | DISCHARGE
Start: 2020-01-15

## 2020-01-15 RX ORDER — LOSARTAN POTASSIUM 100 MG/1
1 TABLET, FILM COATED ORAL
Qty: 0 | Refills: 0 | DISCHARGE
Start: 2020-01-15

## 2020-01-15 RX ORDER — LOSARTAN POTASSIUM 100 MG/1
1 TABLET, FILM COATED ORAL
Qty: 0 | Refills: 0 | DISCHARGE

## 2020-01-15 RX ORDER — METOPROLOL TARTRATE 50 MG
1 TABLET ORAL
Qty: 0 | Refills: 0 | DISCHARGE

## 2020-01-15 RX ORDER — ASPIRIN/CALCIUM CARB/MAGNESIUM 324 MG
1 TABLET ORAL
Qty: 0 | Refills: 0 | DISCHARGE
Start: 2020-01-15

## 2020-01-15 RX ADMIN — Medication 75 MICROGRAM(S): at 05:34

## 2020-01-15 RX ADMIN — APIXABAN 2.5 MILLIGRAM(S): 2.5 TABLET, FILM COATED ORAL at 05:34

## 2020-01-15 RX ADMIN — AMLODIPINE BESYLATE 2.5 MILLIGRAM(S): 2.5 TABLET ORAL at 05:34

## 2020-01-15 RX ADMIN — CALCITRIOL 0.25 MICROGRAM(S): 0.5 CAPSULE ORAL at 11:56

## 2020-01-15 RX ADMIN — PANTOPRAZOLE SODIUM 40 MILLIGRAM(S): 20 TABLET, DELAYED RELEASE ORAL at 05:34

## 2020-01-15 RX ADMIN — Medication 100 MILLIGRAM(S): at 11:56

## 2020-01-15 RX ADMIN — LOSARTAN POTASSIUM 25 MILLIGRAM(S): 100 TABLET, FILM COATED ORAL at 05:34

## 2020-01-15 RX ADMIN — Medication 25 MILLIGRAM(S): at 05:34

## 2020-01-15 RX ADMIN — Medication 200 MILLIGRAM(S): at 05:35

## 2020-01-15 RX ADMIN — Medication 81 MILLIGRAM(S): at 11:56

## 2020-01-15 RX ADMIN — Medication 4 MILLIGRAM(S): at 05:34

## 2020-01-15 RX ADMIN — Medication 1 DROP(S): at 05:34

## 2020-01-27 DIAGNOSIS — E03.9 HYPOTHYROIDISM, UNSPECIFIED: ICD-10-CM

## 2020-01-27 DIAGNOSIS — S22.049D: ICD-10-CM

## 2020-01-27 DIAGNOSIS — S22.059D UNSPECIFIED FRACTURE OF T5-T6 VERTEBRA, SUBSEQUENT ENCOUNTER FOR FRACTURE WITH ROUTINE HEALING: ICD-10-CM

## 2020-01-27 DIAGNOSIS — S06.4X0D EPIDURAL HEMORRHAGE WITHOUT LOSS OF CONSCIOUSNESS, SUBSEQUENT ENCOUNTER: ICD-10-CM

## 2020-01-27 DIAGNOSIS — I73.9 PERIPHERAL VASCULAR DISEASE, UNSPECIFIED: ICD-10-CM

## 2020-01-27 DIAGNOSIS — Z95.5 PRESENCE OF CORONARY ANGIOPLASTY IMPLANT AND GRAFT: ICD-10-CM

## 2020-01-27 DIAGNOSIS — G47.33 OBSTRUCTIVE SLEEP APNEA (ADULT) (PEDIATRIC): ICD-10-CM
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
## 2020-01-27 DIAGNOSIS — I48.0 PAROXYSMAL ATRIAL FIBRILLATION: ICD-10-CM

## 2020-01-27 DIAGNOSIS — Z79.01 LONG TERM (CURRENT) USE OF ANTICOAGULANTS: ICD-10-CM

## 2020-01-27 DIAGNOSIS — N18.9 CHRONIC KIDNEY DISEASE, UNSPECIFIED: ICD-10-CM

## 2020-01-27 DIAGNOSIS — S22.069D UNSPECIFIED FRACTURE OF T7-T8 VERTEBRA, SUBSEQUENT ENCOUNTER FOR FRACTURE WITH ROUTINE HEALING: ICD-10-CM
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
## 2020-01-27 DIAGNOSIS — I12.9 HYPERTENSIVE CHRONIC KIDNEY DISEASE WITH STAGE 1 THROUGH STAGE 4 CHRONIC KIDNEY DISEASE, OR UNSPECIFIED CHRONIC KIDNEY DISEASE: ICD-10-CM

## 2020-01-27 DIAGNOSIS — D64.9 ANEMIA, UNSPECIFIED: ICD-10-CM

## 2020-01-27 DIAGNOSIS — E78.5 HYPERLIPIDEMIA, UNSPECIFIED: ICD-10-CM

## 2020-01-27 DIAGNOSIS — Z99.89 DEPENDENCE ON OTHER ENABLING MACHINES AND DEVICES: ICD-10-CM

## 2020-01-27 DIAGNOSIS — R00.1 BRADYCARDIA, UNSPECIFIED: ICD-10-CM

## 2020-01-27 DIAGNOSIS — I25.10 ATHEROSCLEROTIC HEART DISEASE OF NATIVE CORONARY ARTERY WITHOUT ANGINA PECTORIS: ICD-10-CM

## 2020-01-27 DIAGNOSIS — S24.102D: ICD-10-CM

## 2020-01-27 DIAGNOSIS — M10.9 GOUT, UNSPECIFIED: ICD-10-CM

## 2020-02-19 ENCOUNTER — EMERGENCY (EMERGENCY)
Facility: HOSPITAL | Age: 85
LOS: 0 days | Discharge: HOME | End: 2020-02-19
Attending: EMERGENCY MEDICINE | Admitting: EMERGENCY MEDICINE
Payer: MEDICARE

## 2020-02-19 VITALS
DIASTOLIC BLOOD PRESSURE: 90 MMHG | HEART RATE: 70 BPM | SYSTOLIC BLOOD PRESSURE: 190 MMHG | OXYGEN SATURATION: 99 % | RESPIRATION RATE: 16 BRPM

## 2020-02-19 VITALS
RESPIRATION RATE: 18 BRPM | DIASTOLIC BLOOD PRESSURE: 86 MMHG | SYSTOLIC BLOOD PRESSURE: 184 MMHG | TEMPERATURE: 98 F | HEART RATE: 65 BPM | OXYGEN SATURATION: 98 %

## 2020-02-19 DIAGNOSIS — I10 ESSENTIAL (PRIMARY) HYPERTENSION: ICD-10-CM

## 2020-02-19 DIAGNOSIS — M54.2 CERVICALGIA: ICD-10-CM

## 2020-02-19 DIAGNOSIS — S80.211A ABRASION, RIGHT KNEE, INITIAL ENCOUNTER: ICD-10-CM

## 2020-02-19 DIAGNOSIS — Z23 ENCOUNTER FOR IMMUNIZATION: ICD-10-CM

## 2020-02-19 DIAGNOSIS — Z98.890 OTHER SPECIFIED POSTPROCEDURAL STATES: Chronic | ICD-10-CM

## 2020-02-19 DIAGNOSIS — M25.511 PAIN IN RIGHT SHOULDER: ICD-10-CM

## 2020-02-19 DIAGNOSIS — Z98.890 OTHER SPECIFIED POSTPROCEDURAL STATES: ICD-10-CM

## 2020-02-19 DIAGNOSIS — I48.91 UNSPECIFIED ATRIAL FIBRILLATION: ICD-10-CM

## 2020-02-19 DIAGNOSIS — E78.5 HYPERLIPIDEMIA, UNSPECIFIED: ICD-10-CM
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
## 2020-02-19 DIAGNOSIS — G47.30 SLEEP APNEA, UNSPECIFIED: Chronic | ICD-10-CM
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
## 2020-02-19 DIAGNOSIS — Z88.0 ALLERGY STATUS TO PENICILLIN: ICD-10-CM

## 2020-02-19 LAB
ALBUMIN SERPL ELPH-MCNC: 4 G/DL — SIGNIFICANT CHANGE UP (ref 3.5–5.2)
ALP SERPL-CCNC: 86 U/L — SIGNIFICANT CHANGE UP (ref 30–115)
ALT FLD-CCNC: 17 U/L — SIGNIFICANT CHANGE UP (ref 0–41)
ANION GAP SERPL CALC-SCNC: 8 MMOL/L — SIGNIFICANT CHANGE UP (ref 7–14)
APTT BLD: 30.9 SEC — SIGNIFICANT CHANGE UP (ref 27–39.2)
AST SERPL-CCNC: 28 U/L — SIGNIFICANT CHANGE UP (ref 0–41)
BASOPHILS # BLD AUTO: 0.03 K/UL — SIGNIFICANT CHANGE UP (ref 0–0.2)
BASOPHILS NFR BLD AUTO: 0.5 % — SIGNIFICANT CHANGE UP (ref 0–1)
BILIRUB SERPL-MCNC: 0.4 MG/DL — SIGNIFICANT CHANGE UP (ref 0.2–1.2)
BLD GP AB SCN SERPL QL: SIGNIFICANT CHANGE UP
BUN SERPL-MCNC: 27 MG/DL — HIGH (ref 10–20)
CALCIUM SERPL-MCNC: 10.6 MG/DL — HIGH (ref 8.5–10.1)
CHLORIDE SERPL-SCNC: 106 MMOL/L — SIGNIFICANT CHANGE UP (ref 98–110)
CO2 SERPL-SCNC: 28 MMOL/L — SIGNIFICANT CHANGE UP (ref 17–32)
CREAT SERPL-MCNC: 1.5 MG/DL — SIGNIFICANT CHANGE UP (ref 0.7–1.5)
EOSINOPHIL # BLD AUTO: 0.3 K/UL — SIGNIFICANT CHANGE UP (ref 0–0.7)
EOSINOPHIL NFR BLD AUTO: 4.8 % — SIGNIFICANT CHANGE UP (ref 0–8)
ETHANOL SERPL-MCNC: <10 MG/DL — SIGNIFICANT CHANGE UP
GLUCOSE SERPL-MCNC: 124 MG/DL — HIGH (ref 70–99)
HCT VFR BLD CALC: 40.5 % — LOW (ref 42–52)
HGB BLD-MCNC: 13.7 G/DL — LOW (ref 14–18)
IMM GRANULOCYTES NFR BLD AUTO: 0.5 % — HIGH (ref 0.1–0.3)
INR BLD: 1.06 RATIO — SIGNIFICANT CHANGE UP (ref 0.65–1.3)
LACTATE SERPL-SCNC: 0.8 MMOL/L — SIGNIFICANT CHANGE UP (ref 0.7–2)
LIDOCAIN IGE QN: 20 U/L — SIGNIFICANT CHANGE UP (ref 7–60)
LYMPHOCYTES # BLD AUTO: 1.32 K/UL — SIGNIFICANT CHANGE UP (ref 1.2–3.4)
LYMPHOCYTES # BLD AUTO: 21 % — SIGNIFICANT CHANGE UP (ref 20.5–51.1)
MCHC RBC-ENTMCNC: 33.8 G/DL — SIGNIFICANT CHANGE UP (ref 32–37)
MCHC RBC-ENTMCNC: 35.2 PG — HIGH (ref 27–31)
MCV RBC AUTO: 104.1 FL — HIGH (ref 80–94)
MONOCYTES # BLD AUTO: 0.83 K/UL — HIGH (ref 0.1–0.6)
MONOCYTES NFR BLD AUTO: 13.2 % — HIGH (ref 1.7–9.3)
NEUTROPHILS # BLD AUTO: 3.79 K/UL — SIGNIFICANT CHANGE UP (ref 1.4–6.5)
NEUTROPHILS NFR BLD AUTO: 60 % — SIGNIFICANT CHANGE UP (ref 42.2–75.2)
NRBC # BLD: 0 /100 WBCS — SIGNIFICANT CHANGE UP (ref 0–0)
PLATELET # BLD AUTO: 105 K/UL — LOW (ref 130–400)
POTASSIUM SERPL-MCNC: 4.3 MMOL/L — SIGNIFICANT CHANGE UP (ref 3.5–5)
POTASSIUM SERPL-SCNC: 4.3 MMOL/L — SIGNIFICANT CHANGE UP (ref 3.5–5)
PROT SERPL-MCNC: 6.6 G/DL — SIGNIFICANT CHANGE UP (ref 6–8)
PROTHROM AB SERPL-ACNC: 12.2 SEC — SIGNIFICANT CHANGE UP (ref 9.95–12.87)
RBC # BLD: 3.89 M/UL — LOW (ref 4.7–6.1)
RBC # FLD: 14.2 % — SIGNIFICANT CHANGE UP (ref 11.5–14.5)
SODIUM SERPL-SCNC: 142 MMOL/L — SIGNIFICANT CHANGE UP (ref 135–146)
WBC # BLD: 6.3 K/UL — SIGNIFICANT CHANGE UP (ref 4.8–10.8)
WBC # FLD AUTO: 6.3 K/UL — SIGNIFICANT CHANGE UP (ref 4.8–10.8)

## 2020-02-19 PROCEDURE — 99285 EMERGENCY DEPT VISIT HI MDM: CPT

## 2020-02-19 PROCEDURE — 73562 X-RAY EXAM OF KNEE 3: CPT | Mod: 26,RT

## 2020-02-19 PROCEDURE — 72170 X-RAY EXAM OF PELVIS: CPT | Mod: 26

## 2020-02-19 PROCEDURE — 72125 CT NECK SPINE W/O DYE: CPT | Mod: 26

## 2020-02-19 PROCEDURE — 74177 CT ABD & PELVIS W/CONTRAST: CPT | Mod: 26

## 2020-02-19 PROCEDURE — 71260 CT THORAX DX C+: CPT | Mod: 26

## 2020-02-19 PROCEDURE — 71045 X-RAY EXAM CHEST 1 VIEW: CPT | Mod: 26

## 2020-02-19 PROCEDURE — 93010 ELECTROCARDIOGRAM REPORT: CPT

## 2020-02-19 PROCEDURE — 70450 CT HEAD/BRAIN W/O DYE: CPT | Mod: 26

## 2020-02-19 RX ORDER — TETANUS TOXOID, REDUCED DIPHTHERIA TOXOID AND ACELLULAR PERTUSSIS VACCINE, ADSORBED 5; 2.5; 8; 8; 2.5 [IU]/.5ML; [IU]/.5ML; UG/.5ML; UG/.5ML; UG/.5ML
0.5 SUSPENSION INTRAMUSCULAR ONCE
Refills: 0 | Status: COMPLETED | OUTPATIENT
Start: 2020-02-19 | End: 2020-02-19

## 2020-02-19 RX ADMIN — TETANUS TOXOID, REDUCED DIPHTHERIA TOXOID AND ACELLULAR PERTUSSIS VACCINE, ADSORBED 0.5 MILLILITER(S): 5; 2.5; 8; 8; 2.5 SUSPENSION INTRAMUSCULAR at 20:01
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
## 2020-02-19 NOTE — ED PROVIDER NOTE - NS ED ROS FT
Constitutional: See HPI.  Eyes: No visual changes,  ENMT: see hpi  Cardiac: No cp  Respiratory: No cough   GI: No nausea, vomiting, diarrhea or abdominal pain.  : No dysuria, frequency or burning. No Discharge  MS: No myalgia, muscle weakness, joint pain or back pain.  Psych: No suicidal or homicidal ideations.  Neuro: No headache   Skin: No skin rash.

## 2020-02-19 NOTE — ED PROVIDER NOTE - PHYSICAL EXAMINATION
CONSTITUTIONAL: WA / WN / NAD  HEAD: NCAT  EYES: PERRL; EOMI;   ENT: Normal pharynx; mucous membranes pink/moist, no erythema.  NECK: Supple; no meningeal signs  CARD: IRR; nl S1/S2  RESP: Respiratory rate and effort are normal; breath sounds clear and equal bilaterally.  ABD: Soft, NT ND  MSK/EXT: No gross deformities; full range of motion. Thoracic midline ttp. No CL spine ttp.   SKIN: Warm and dry; + right knee abrasion  NEURO: AAOx3, Motor 5/5 x 4   PSYCH: Memory Intact, Normal Affect
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
## 2020-02-19 NOTE — ED PROVIDER NOTE - NSFOLLOWUPCLINICS_GEN_ALL_ED_FT
[facility]  Trauma Surgery  [address] [place] [address]  Phone: [phone]  Fax:   Follow Up Time:
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
## 2020-02-19 NOTE — ED PROVIDER NOTE - PATIENT PORTAL LINK FT
You can access the FollowMyHealth Patient Portal offered by [facility] by registering at the following website: [email or web address]. By joining [facility]’s FollowMyHealth portal, you will also be able to view your health information using other applications (apps) compatible with our system.

## 2020-03-30 ENCOUNTER — APPOINTMENT (OUTPATIENT)
Dept: VASCULAR SURGERY | Facility: CLINIC | Age: 85
End: 2020-03-30

## 2023-02-25 NOTE — ED ADULT TRIAGE NOTE - NS ED NURSE BANDS TYPE
Patient reports severe thoughts of SI, no plan or intent.  Patient states he is looking into mental health residential options at [facility].  Treatment team aware.  Patient agrees to notify staff of thoughts worsen.  Will continue to monitor.        Name band;

## 2024-12-11 NOTE — ED ADULT TRIAGE NOTE - LOCATION:
I have reviewed neurologist note.  I called and spoke to patient's daughter prescription sent to the pharmacy for Aricept.  Have encouraged her to read the side effects of the medications on a MyChart message  
Left arm;